# Patient Record
Sex: FEMALE | Race: BLACK OR AFRICAN AMERICAN | NOT HISPANIC OR LATINO | Employment: OTHER
[De-identification: names, ages, dates, MRNs, and addresses within clinical notes are randomized per-mention and may not be internally consistent; named-entity substitution may affect disease eponyms.]

---

## 2017-02-02 ENCOUNTER — HOSPITAL (OUTPATIENT)
Dept: OTHER | Age: 67
End: 2017-02-02
Attending: INTERNAL MEDICINE

## 2017-06-02 ENCOUNTER — HOSPITAL (OUTPATIENT)
Dept: OTHER | Age: 67
End: 2017-06-02
Attending: INTERNAL MEDICINE

## 2017-06-02 LAB
ALBUMIN: 4 G/DL (ref 3.5–5)
ANION GAP SERPL CALC-SCNC: 16 MEQ/L (ref 10–20)
BUN SERPL-MCNC: 17 MG/DL (ref 6–20)
CALCIUM: 9.9 MG/DL (ref 8.4–10.2)
CHLORIDE: 106 MEQ/L (ref 97–107)
CORR CA*PHOS: 44
CORR CALCIUM: 9.9 MG/DL (ref 8.4–10.2)
CREATININE: 1.18 MG/DL (ref 0.6–1.3)
GLUCOSE LVL: 112 MG/DL (ref 70–99)
HEMOLYSIS 1+: NEGATIVE
HEMOLYSIS 2+: NEGATIVE
ICTERIC 4+: NEGATIVE
LIPEMIC 2+: NEGATIVE
LIPEMIC 3+: NEGATIVE
MAGNESIUM LEVEL: 1.9 MG/DL (ref 1.6–2.6)
PHOSPHORUS: 4.4 MG/DL (ref 2.3–4.7)
POTASSIUM: 4.8 MEQ/L (ref 3.5–5.1)
SODIUM: 143 MEQ/L (ref 136–145)
TCO2: 26 MEQ/L (ref 19–29)
URIC ACID: 8.3 MG/DL (ref 2.6–6)
VIT D 25 OH LVL: 45.5 NG/ML

## 2017-11-02 ENCOUNTER — HOSPITAL (OUTPATIENT)
Dept: OTHER | Age: 67
End: 2017-11-02
Attending: INTERNAL MEDICINE

## 2017-11-02 LAB
ALBUMIN: 3.8 G/DL (ref 3.5–5)
ANION GAP SERPL CALC-SCNC: 14 MEQ/L (ref 10–20)
BUN SERPL-MCNC: 30 MG/DL (ref 6–20)
CALCIUM: 10.2 MG/DL (ref 8.4–10.2)
CHLORIDE: 107 MEQ/L (ref 97–107)
CORR CA*PHOS: 38
CORR CALCIUM: 10.4 MG/DL (ref 8.4–10.2)
CREATININE: 1.4 MG/DL (ref 0.6–1.3)
GLUCOSE LVL: 102 MG/DL (ref 70–99)
HEMOLYSIS 2+: NEGATIVE
LIPEMIC 3+: NEGATIVE
PHOSPHORUS: 3.7 MG/DL (ref 2.3–4.7)
POTASSIUM: 4.2 MEQ/L (ref 3.5–5.1)
PROT/CREAT RATIO: 0.2
SODIUM: 143 MEQ/L (ref 136–145)
TCO2: 26 MEQ/L (ref 19–29)
U CREATININE: 258.12 MG/DL
U PROTEIN: 40 MG/DL
UA APPEAR: ABNORMAL
UA BACTERIA: ABNORMAL
UA BILI: NEGATIVE
UA BLOOD: ABNORMAL
UA COLOR: YELLOW
UA EPITHELIAL: ABNORMAL
UA GLUCOSE: NEGATIVE
UA KETONES: ABNORMAL
UA LEUK EST: ABNORMAL
UA NITRITE: NEGATIVE
UA PH: 6 (ref 5–7)
UA PROTEIN: ABNORMAL
UA RBC: ABNORMAL
UA SPEC GRAV: 1.01 (ref 1.01–1.02)
UA UROBILINOGEN: 1 MG/DL (ref 0.2–1)
UA WBC: ABNORMAL

## 2018-02-08 ENCOUNTER — HOSPITAL (OUTPATIENT)
Dept: OTHER | Age: 68
End: 2018-02-08
Attending: INTERNAL MEDICINE

## 2018-02-08 LAB
ANION GAP SERPL CALC-SCNC: 15 MEQ/L (ref 10–20)
BUN SERPL-MCNC: 21 MG/DL (ref 6–20)
CALCIUM: 9.8 MG/DL (ref 8.4–10.2)
CHLORIDE: 108 MEQ/L (ref 97–107)
CREATININE: 1.22 MG/DL (ref 0.6–1.3)
GLUCOSE LVL: 112 MG/DL (ref 70–99)
HEMOLYSIS 2+: NEGATIVE
POTASSIUM: 4.3 MEQ/L (ref 3.5–5.1)
PROT/CREAT RATIO: 0.2
SODIUM: 144 MEQ/L (ref 136–145)
TCO2: 25 MEQ/L (ref 19–29)
U CREATININE: 132.36 MG/DL
U PROTEIN: 21 MG/DL
UA APPEAR: CLEAR
UA BACTERIA: ABNORMAL
UA BILI: NEGATIVE
UA BLOOD: ABNORMAL
UA CAST: ABNORMAL
UA COLOR: YELLOW
UA EPITHELIAL: ABNORMAL
UA GLUCOSE: NEGATIVE
UA KETONES: NEGATIVE
UA LEUK EST: ABNORMAL
UA MUCOUS: ABNORMAL
UA NITRITE: NEGATIVE
UA PH: 5.5 (ref 5–7)
UA PROTEIN: NEGATIVE
UA RBC: ABNORMAL
UA SPEC GRAV: <=1.005 (ref 1.01–1.02)
UA UROBILINOGEN: 1 MG/DL (ref 0.2–1)
UA WBC: ABNORMAL

## 2018-02-22 ENCOUNTER — HOSPITAL (OUTPATIENT)
Dept: OTHER | Age: 68
End: 2018-02-22
Attending: INTERNAL MEDICINE

## 2018-06-07 ENCOUNTER — HOSPITAL (OUTPATIENT)
Dept: OTHER | Age: 68
End: 2018-06-07
Attending: INTERNAL MEDICINE

## 2018-06-07 LAB
ANION GAP SERPL CALC-SCNC: 14 MEQ/L (ref 10–20)
BUN SERPL-MCNC: 21 MG/DL (ref 6–20)
CALCIUM: 9.7 MG/DL (ref 8.4–10.2)
CHLORIDE: 107 MEQ/L (ref 97–107)
CREATININE: 1.43 MG/DL (ref 0.6–1.3)
GLUCOSE LVL: 112 MG/DL (ref 70–99)
HEMOLYSIS 2+: NEGATIVE
POTASSIUM: 4.9 MEQ/L (ref 3.5–5.1)
SODIUM: 143 MEQ/L (ref 136–145)
TCO2: 27 MEQ/L (ref 19–29)
UA APPEAR: CLEAR
UA BACTERIA: ABNORMAL
UA BILI: NEGATIVE
UA BLOOD: ABNORMAL
UA CAST: ABNORMAL
UA COLOR: YELLOW
UA EPITHELIAL: ABNORMAL
UA GLUCOSE: NEGATIVE
UA KETONES: ABNORMAL
UA LEUK EST: ABNORMAL
UA NITRITE: NEGATIVE
UA PH: 6 (ref 5–7)
UA PROTEIN: NEGATIVE
UA RBC: ABNORMAL
UA SPEC GRAV: 1.01 (ref 1.01–1.02)
UA UROBILINOGEN: 1 MG/DL (ref 0.2–1)
UA WBC: ABNORMAL

## 2018-06-26 PROBLEM — M16.0 PRIMARY OSTEOARTHRITIS OF BOTH HIPS: Status: ACTIVE | Noted: 2017-06-02

## 2018-10-12 ENCOUNTER — HOSPITAL (OUTPATIENT)
Dept: OTHER | Age: 68
End: 2018-10-12
Attending: INTERNAL MEDICINE

## 2018-10-12 LAB
ABS LYMPH: 1.8 K/CUMM (ref 1–3.5)
ABS MONO: 0.6 K/CUMM (ref 0.1–0.8)
ABS NEUTRO: 2.8 K/CUMM (ref 2–8)
ANION GAP SERPL CALC-SCNC: 13 MEQ/L (ref 10–20)
BASOPHIL: 1 % (ref 0–1)
BUN SERPL-MCNC: 23 MG/DL (ref 6–20)
CALCIUM: 9.4 MG/DL (ref 8.4–10.2)
CHLORIDE: 110 MEQ/L (ref 97–107)
CREATININE: 1.5 MG/DL (ref 0.6–1.3)
DIFF_TYPE?: ABNORMAL
EOSINOPHIL: 2 % (ref 0–6)
GLUCOSE LVL: 115 MG/DL (ref 70–99)
HCT VFR BLD CALC: 34 % (ref 33–45)
HEMOLYSIS 1+: NEGATIVE
HEMOLYSIS 2+: NEGATIVE
HGB BLD-MCNC: 11 G/DL (ref 11–15)
IMMATURE GRAN: 0.2 % (ref 0–0.3)
INSTR WBC: 5.3 K/CUMM (ref 4–11)
LYMPHOCYTE: 33 %
MCH RBC QN AUTO: 31 PG (ref 25–35)
MCHC RBC AUTO-ENTMCNC: 33 G/DL (ref 32–37)
MCV RBC AUTO: 96 FL (ref 78–97)
MONOCYTE: 10 %
NEUTROPHIL: 53 %
NRBC BLD MANUAL-RTO: 0 % (ref 0–0.2)
PLATELET: 244 K/CUMM (ref 150–450)
POTASSIUM: 4.1 MEQ/L (ref 3.5–5.1)
PROT/CREAT RATIO: 0.1
RBC # BLD: 3.51 M/CUMM (ref 3.7–5.2)
RDW: 13.8 % (ref 11.5–14.5)
SODIUM: 144 MEQ/L (ref 136–145)
TCO2: 25 MEQ/L (ref 19–29)
U CREATININE: 150.69 MG/DL
U PROTEIN: 21 MG/DL
UA APPEAR: CLEAR
UA BACTERIA: ABNORMAL
UA BILI: NEGATIVE
UA BLOOD: ABNORMAL
UA CAST: ABNORMAL
UA COLOR: YELLOW
UA EPITHELIAL: ABNORMAL
UA GLUCOSE: NEGATIVE
UA KETONES: ABNORMAL
UA LEUK EST: ABNORMAL
UA NITRITE: NEGATIVE
UA PH: 6 (ref 5–7)
UA PROTEIN: NEGATIVE
UA RBC: ABNORMAL
UA SPEC GRAV: 1.02 (ref 1.01–1.02)
UA UROBILINOGEN: 0.2 MG/DL (ref 0.2–1)
UA WBC: ABNORMAL
VIT D 25 OH LVL: 28 NG/ML
WBC # BLD: 5.3 K/CUMM (ref 4–11)

## 2018-11-05 ENCOUNTER — HOSPITAL (OUTPATIENT)
Dept: OTHER | Age: 68
End: 2018-11-05
Attending: INTERNAL MEDICINE

## 2018-11-05 LAB
ABS LYMPH: 1.7 K/CUMM (ref 1–3.5)
ABS MONO: 0.6 K/CUMM (ref 0.1–0.8)
ABS NEUTRO: 4.8 K/CUMM (ref 2–8)
ALBUMIN: 3.8 G/DL (ref 3.5–5)
ANION GAP SERPL CALC-SCNC: 11 MEQ/L (ref 10–20)
BASOPHIL: 1 % (ref 0–1)
BUN SERPL-MCNC: 28 MG/DL (ref 6–20)
CALCIUM: 9.5 MG/DL (ref 8.4–10.2)
CHLORIDE: 111 MEQ/L (ref 97–107)
CORR CA*PHOS: 37
CORR CALCIUM: 9.7 MG/DL (ref 8.4–10.2)
CREATININE: 1.56 MG/DL (ref 0.6–1.3)
DIFF_TYPE?: ABNORMAL
EOSINOPHIL: 1 % (ref 0–6)
GLUCOSE LVL: 104 MG/DL (ref 70–99)
HCT VFR BLD CALC: 33 % (ref 33–45)
HEMOLYSIS 2+: NEGATIVE
HGB BLD-MCNC: 10.9 G/DL (ref 11–15)
IMMATURE GRAN: 0.4 % (ref 0–0.3)
INSTR WBC: 7.2 K/CUMM (ref 4–11)
LIPEMIC 3+: NEGATIVE
LYMPHOCYTE: 24 %
MCH RBC QN AUTO: 31 PG (ref 25–35)
MCHC RBC AUTO-ENTMCNC: 33 G/DL (ref 32–37)
MCV RBC AUTO: 95 FL (ref 78–97)
MONOCYTE: 8 %
NEUTROPHIL: 66 %
NRBC BLD MANUAL-RTO: 0 % (ref 0–0.2)
PHOSPHORUS: 3.8 MG/DL (ref 2.3–4.7)
PLATELET: 255 K/CUMM (ref 150–450)
POTASSIUM: 4.2 MEQ/L (ref 3.5–5.1)
PROT/CREAT RATIO: NORMAL
RBC # BLD: 3.53 M/CUMM (ref 3.7–5.2)
RDW: 14.3 % (ref 11.5–14.5)
SODIUM: 141 MEQ/L (ref 136–145)
TCO2: 23 MEQ/L (ref 19–29)
U CREATININE: 47.84 MG/DL
U PROTEIN: <7 MG/DL
UA APPEAR: CLEAR
UA BACTERIA: ABNORMAL
UA BILI: NEGATIVE
UA BLOOD: NEGATIVE
UA COLOR: YELLOW
UA EPITHELIAL: ABNORMAL
UA GLUCOSE: NEGATIVE
UA KETONES: NEGATIVE
UA LEUK EST: ABNORMAL
UA NITRITE: NEGATIVE
UA PH: 6 (ref 5–7)
UA PROTEIN: NEGATIVE
UA RBC: ABNORMAL
UA SPEC GRAV: <=1.005 (ref 1.01–1.02)
UA UROBILINOGEN: 0.2 MG/DL (ref 0.2–1)
UA WBC: ABNORMAL
WBC # BLD: 7.2 K/CUMM (ref 4–11)

## 2018-12-07 ENCOUNTER — HOSPITAL (OUTPATIENT)
Dept: OTHER | Age: 68
End: 2018-12-07
Attending: INTERNAL MEDICINE

## 2018-12-07 LAB
ABS LYMPH: 1.7 K/CUMM (ref 1–3.5)
ABS MONO: 0.6 K/CUMM (ref 0.1–0.8)
ABS NEUTRO: 3.2 K/CUMM (ref 2–8)
ALBUMIN: 3.8 G/DL (ref 3.5–5)
ANION GAP SERPL CALC-SCNC: 15 MEQ/L (ref 10–20)
BASOPHIL: 0 % (ref 0–1)
BUN SERPL-MCNC: 20 MG/DL (ref 6–20)
CALCIUM: 9.5 MG/DL (ref 8.4–10.2)
CHLORIDE: 111 MEQ/L (ref 97–107)
CORR CA*PHOS: 34
CORR CALCIUM: 9.7 MG/DL (ref 8.4–10.2)
CREATININE: 1.43 MG/DL (ref 0.6–1.3)
DIFF_TYPE?: ABNORMAL
EOSINOPHIL: 1 % (ref 0–6)
GLUCOSE LVL: 111 MG/DL (ref 70–99)
HCT VFR BLD CALC: 34 % (ref 33–45)
HEMOLYSIS 2+: NEGATIVE
HGB BLD-MCNC: 11 G/DL (ref 11–15)
IMMATURE GRAN: 0.2 % (ref 0–0.3)
INSTR WBC: 5.6 K/CUMM (ref 4–11)
LIPEMIC 3+: NEGATIVE
LYMPHOCYTE: 30 %
MCH RBC QN AUTO: 30 PG (ref 25–35)
MCHC RBC AUTO-ENTMCNC: 32 G/DL (ref 32–37)
MCV RBC AUTO: 94 FL (ref 78–97)
MONOCYTE: 10 %
NEUTROPHIL: 58 %
NRBC BLD MANUAL-RTO: 0 % (ref 0–0.2)
PHOSPHORUS: 3.5 MG/DL (ref 2.3–4.7)
PLATELET: 239 K/CUMM (ref 150–450)
POTASSIUM: 3.9 MEQ/L (ref 3.5–5.1)
RBC # BLD: 3.65 M/CUMM (ref 3.7–5.2)
RDW: 13.9 % (ref 11.5–14.5)
SODIUM: 144 MEQ/L (ref 136–145)
TCO2: 22 MEQ/L (ref 19–29)
UA APPEAR: CLEAR
UA BACTERIA: ABNORMAL
UA BILI: NEGATIVE
UA BLOOD: NEGATIVE
UA CAST: ABNORMAL
UA COLOR: YELLOW
UA EPITHELIAL: ABNORMAL
UA GLUCOSE: NEGATIVE
UA KETONES: ABNORMAL
UA LEUK EST: NEGATIVE
UA NITRITE: NEGATIVE
UA PH: 5 (ref 5–7)
UA PROTEIN: ABNORMAL
UA RBC: ABNORMAL
UA SPEC GRAV: 1.02 (ref 1.01–1.02)
UA UROBILINOGEN: 1 MG/DL (ref 0.2–1)
UA WBC: ABNORMAL
WBC # BLD: 5.6 K/CUMM (ref 4–11)

## 2018-12-18 PROBLEM — D64.9 ANEMIA: Status: ACTIVE | Noted: 2017-06-15

## 2018-12-18 PROBLEM — M25.559 PAIN IN JOINT, PELVIC REGION AND THIGH: Status: ACTIVE | Noted: 2017-03-31

## 2018-12-18 PROBLEM — E87.5 HYPERKALEMIA: Status: ACTIVE | Noted: 2017-06-15

## 2019-03-19 ENCOUNTER — HOSPITAL (OUTPATIENT)
Dept: OTHER | Age: 69
End: 2019-03-19
Attending: INTERNAL MEDICINE

## 2019-03-19 PROCEDURE — 81001 URINALYSIS AUTO W/SCOPE: CPT | Performed by: INTERNAL MEDICINE

## 2019-03-19 PROCEDURE — 87086 URINE CULTURE/COLONY COUNT: CPT | Performed by: INTERNAL MEDICINE

## 2019-12-10 PROBLEM — Z86.39 HISTORY OF HYPERPARATHYROIDISM: Status: ACTIVE | Noted: 2019-12-10

## 2020-02-21 ENCOUNTER — HOSPITAL (OUTPATIENT)
Dept: OTHER | Age: 70
End: 2020-02-21
Attending: INTERNAL MEDICINE

## 2020-10-21 PROBLEM — N18.30 STAGE 3 CHRONIC KIDNEY DISEASE, UNSPECIFIED WHETHER STAGE 3A OR 3B CKD (HCC): Status: ACTIVE | Noted: 2020-10-21

## 2021-01-29 PROBLEM — G47.33 OBSTRUCTIVE SLEEP APNEA SYNDROME: Status: ACTIVE | Noted: 2021-01-29

## 2021-01-29 PROBLEM — R06.09 DYSPNEA ON EXERTION: Status: ACTIVE | Noted: 2021-01-29

## 2021-01-29 PROBLEM — I27.20 PULMONARY HTN (HCC): Status: ACTIVE | Noted: 2021-01-29

## 2021-01-29 PROBLEM — R06.00 DYSPNEA ON EXERTION: Status: ACTIVE | Noted: 2021-01-29

## 2021-08-18 ENCOUNTER — HOSPITAL ENCOUNTER (OUTPATIENT)
Dept: MAMMOGRAPHY | Age: 71
Discharge: HOME | End: 2021-08-18
Attending: INTERNAL MEDICINE

## 2021-08-18 ENCOUNTER — HOSPITAL ENCOUNTER (OUTPATIENT)
Dept: NUCLEAR MEDICINE | Age: 71
Discharge: HOME | End: 2021-08-18
Attending: INTERNAL MEDICINE

## 2021-08-18 DIAGNOSIS — Z78.0 POSTMENOPAUSAL: ICD-10-CM

## 2021-08-18 DIAGNOSIS — Z12.31 ENCOUNTER FOR SCREENING MAMMOGRAM FOR MALIGNANT NEOPLASM OF BREAST: ICD-10-CM

## 2021-08-18 PROCEDURE — 77063 BREAST TOMOSYNTHESIS BI: CPT

## 2021-08-18 PROCEDURE — 77080 DXA BONE DENSITY AXIAL: CPT

## 2021-11-10 PROBLEM — Z01.818 PREOPERATIVE EVALUATION TO RULE OUT SURGICAL CONTRAINDICATION: Status: ACTIVE | Noted: 2021-11-10

## 2022-01-25 PROBLEM — Z96.642 STATUS POST LEFT HIP REPLACEMENT: Status: ACTIVE | Noted: 2022-01-25

## 2022-06-01 ENCOUNTER — APPOINTMENT (OUTPATIENT)
Dept: LAB | Age: 72
End: 2022-06-01
Attending: INTERNAL MEDICINE

## 2022-06-09 ENCOUNTER — APPOINTMENT (OUTPATIENT)
Dept: LAB | Age: 72
End: 2022-06-09

## 2022-06-14 ENCOUNTER — HOSPITAL ENCOUNTER (OUTPATIENT)
Dept: LAB | Age: 72
Discharge: HOME | End: 2022-06-14
Attending: INTERNAL MEDICINE

## 2022-06-14 LAB
ALBUMIN SERPL-MCNC: 3.7 G/DL (ref 3.6–5.1)
ALBUMIN/GLOB SERPL: 1 {RATIO} (ref 1–2.4)
ALP SERPL-CCNC: 109 UNITS/L (ref 45–117)
ALT SERPL-CCNC: 17 UNITS/L
ANION GAP SERPL CALC-SCNC: 14 MMOL/L (ref 7–19)
APPEARANCE UR: ABNORMAL
AST SERPL-CCNC: 18 UNITS/L
BACTERIA #/AREA URNS HPF: ABNORMAL /HPF
BASOPHILS # BLD: 0.1 K/MCL (ref 0–0.3)
BASOPHILS NFR BLD: 1 %
BILIRUB SERPL-MCNC: 1 MG/DL (ref 0.2–1)
BILIRUB UR QL STRIP: NEGATIVE
BUN SERPL-MCNC: 24 MG/DL (ref 6–20)
BUN/CREAT SERPL: 17 (ref 7–25)
CALCIUM SERPL-MCNC: 8.9 MG/DL (ref 8.4–10.2)
CHLORIDE SERPL-SCNC: 107 MMOL/L (ref 97–110)
CHOLEST SERPL-MCNC: 104 MG/DL
CHOLEST/HDLC SERPL: 2.2 {RATIO}
CO2 SERPL-SCNC: 27 MMOL/L (ref 21–32)
COLOR UR: YELLOW
CREAT SERPL-MCNC: 1.44 MG/DL (ref 0.51–0.95)
CREAT UR-MCNC: 101 MG/DL
DEPRECATED RDW RBC: 48.4 FL (ref 39–50)
EOSINOPHIL # BLD: 0.1 K/MCL (ref 0–0.5)
EOSINOPHIL NFR BLD: 1 %
ERYTHROCYTE [DISTWIDTH] IN BLOOD: 14.2 % (ref 11–15)
FASTING DURATION TIME PATIENT: ABNORMAL H
FASTING DURATION TIME PATIENT: ABNORMAL H
GFR SERPLBLD BASED ON 1.73 SQ M-ARVRAT: 38 ML/MIN
GLOBULIN SER-MCNC: 3.6 G/DL (ref 2–4)
GLUCOSE SERPL-MCNC: 136 MG/DL (ref 70–99)
GLUCOSE UR STRIP-MCNC: NEGATIVE MG/DL
HCT VFR BLD CALC: 36.4 % (ref 36–46.5)
HDLC SERPL-MCNC: 47 MG/DL
HGB BLD-MCNC: 11.9 G/DL (ref 12–15.5)
HGB UR QL STRIP: NEGATIVE
HYALINE CASTS #/AREA URNS LPF: ABNORMAL /LPF
IMM GRANULOCYTES # BLD AUTO: 0 K/MCL (ref 0–0.2)
IMM GRANULOCYTES # BLD: 0 %
KETONES UR STRIP-MCNC: NEGATIVE MG/DL
LDLC SERPL CALC-MCNC: 48 MG/DL
LEUKOCYTE ESTERASE UR QL STRIP: ABNORMAL
LYMPHOCYTES # BLD: 2.3 K/MCL (ref 1–4)
LYMPHOCYTES NFR BLD: 32 %
MCH RBC QN AUTO: 30 PG (ref 26–34)
MCHC RBC AUTO-ENTMCNC: 32.7 G/DL (ref 32–36.5)
MCV RBC AUTO: 91.7 FL (ref 78–100)
MICROALBUMIN UR-MCNC: 1.33 MG/DL
MICROALBUMIN/CREAT UR: 13.2 MG/G
MONOCYTES # BLD: 0.5 K/MCL (ref 0.3–0.9)
MONOCYTES NFR BLD: 7 %
NEUTROPHILS # BLD: 4.3 K/MCL (ref 1.8–7.7)
NEUTROPHILS NFR BLD: 59 %
NITRITE UR QL STRIP: NEGATIVE
NONHDLC SERPL-MCNC: 57 MG/DL
NRBC BLD MANUAL-RTO: 0 /100 WBC
PH UR STRIP: 7 [PH] (ref 5–7)
PLATELET # BLD AUTO: 305 K/MCL (ref 140–450)
POTASSIUM SERPL-SCNC: 4 MMOL/L (ref 3.4–5.1)
PROT SERPL-MCNC: 7.3 G/DL (ref 6.4–8.2)
PROT UR STRIP-MCNC: NEGATIVE MG/DL
RBC # BLD: 3.97 MIL/MCL (ref 4–5.2)
RBC #/AREA URNS HPF: ABNORMAL /HPF
SODIUM SERPL-SCNC: 144 MMOL/L (ref 135–145)
SP GR UR STRIP: 1.01 (ref 1–1.03)
SQUAMOUS #/AREA URNS HPF: ABNORMAL /HPF
TRIGL SERPL-MCNC: 43 MG/DL
UROBILINOGEN UR STRIP-MCNC: 0.2 MG/DL
WBC # BLD: 7.3 K/MCL (ref 4.2–11)
WBC #/AREA URNS HPF: ABNORMAL /HPF

## 2022-06-14 PROCEDURE — 82043 UR ALBUMIN QUANTITATIVE: CPT | Performed by: INTERNAL MEDICINE

## 2022-06-14 PROCEDURE — 80061 LIPID PANEL: CPT | Performed by: INTERNAL MEDICINE

## 2022-06-14 PROCEDURE — 80053 COMPREHEN METABOLIC PANEL: CPT | Performed by: INTERNAL MEDICINE

## 2022-06-14 PROCEDURE — 85025 COMPLETE CBC W/AUTO DIFF WBC: CPT | Performed by: INTERNAL MEDICINE

## 2022-06-14 PROCEDURE — 81001 URINALYSIS AUTO W/SCOPE: CPT | Performed by: INTERNAL MEDICINE

## 2022-06-14 PROCEDURE — 36415 COLL VENOUS BLD VENIPUNCTURE: CPT | Performed by: INTERNAL MEDICINE

## 2022-09-14 ENCOUNTER — HOSPITAL ENCOUNTER (OUTPATIENT)
Dept: MAMMOGRAPHY | Age: 72
Discharge: HOME | End: 2022-09-14
Attending: INTERNAL MEDICINE

## 2022-09-14 DIAGNOSIS — Z12.31 VISIT FOR SCREENING MAMMOGRAM: ICD-10-CM

## 2022-09-14 PROCEDURE — 77063 BREAST TOMOSYNTHESIS BI: CPT

## 2023-01-18 ENCOUNTER — LAB ENCOUNTER (OUTPATIENT)
Dept: LAB | Facility: HOSPITAL | Age: 73
End: 2023-01-18
Attending: INTERNAL MEDICINE
Payer: MEDICARE

## 2023-01-18 DIAGNOSIS — N18.30 CHRONIC KIDNEY DISEASE, STAGE III (MODERATE) (HCC): ICD-10-CM

## 2023-01-18 DIAGNOSIS — Z01.818 PREOPERATIVE EXAMINATION, UNSPECIFIED: ICD-10-CM

## 2023-01-18 DIAGNOSIS — N18.6 TYPE 2 DIABETES MELLITUS WITH ESRD (END-STAGE RENAL DISEASE) (HCC): Primary | ICD-10-CM

## 2023-01-18 DIAGNOSIS — E11.22 TYPE 2 DIABETES MELLITUS WITH ESRD (END-STAGE RENAL DISEASE) (HCC): Primary | ICD-10-CM

## 2023-01-18 DIAGNOSIS — E78.00 PURE HYPERCHOLESTEROLEMIA: ICD-10-CM

## 2023-01-18 DIAGNOSIS — Z51.81 ENCOUNTER FOR THERAPEUTIC DRUG MONITORING: ICD-10-CM

## 2023-01-18 LAB
ALBUMIN SERPL-MCNC: 3.5 G/DL (ref 3.4–5)
ALBUMIN/GLOB SERPL: 1 {RATIO} (ref 1–2)
ALP LIVER SERPL-CCNC: 110 U/L
ALT SERPL-CCNC: 14 U/L
ANION GAP SERPL CALC-SCNC: 3 MMOL/L (ref 0–18)
AST SERPL-CCNC: 16 U/L (ref 15–37)
BASOPHILS # BLD AUTO: 0.02 X10(3) UL (ref 0–0.2)
BASOPHILS NFR BLD AUTO: 0.3 %
BILIRUB SERPL-MCNC: 1.1 MG/DL (ref 0.1–2)
BILIRUB UR QL: NEGATIVE
BUN BLD-MCNC: 18 MG/DL (ref 7–18)
BUN/CREAT SERPL: 12.8 (ref 10–20)
CALCIUM BLD-MCNC: 8.9 MG/DL (ref 8.5–10.1)
CHLORIDE SERPL-SCNC: 108 MMOL/L (ref 98–112)
CHOLEST SERPL-MCNC: 114 MG/DL (ref ?–200)
CLARITY UR: CLEAR
CO2 SERPL-SCNC: 28 MMOL/L (ref 21–32)
COLOR UR: YELLOW
CREAT BLD-MCNC: 1.41 MG/DL
CREAT UR-SCNC: 59.6 MG/DL
DEPRECATED RDW RBC AUTO: 49.8 FL (ref 35.1–46.3)
EOSINOPHIL # BLD AUTO: 0.06 X10(3) UL (ref 0–0.7)
EOSINOPHIL NFR BLD AUTO: 0.8 %
ERYTHROCYTE [DISTWIDTH] IN BLOOD BY AUTOMATED COUNT: 14.5 % (ref 11–15)
FASTING PATIENT LIPID ANSWER: YES
FASTING STATUS PATIENT QL REPORTED: YES
GFR SERPLBLD BASED ON 1.73 SQ M-ARVRAT: 39 ML/MIN/1.73M2 (ref 60–?)
GLOBULIN PLAS-MCNC: 3.6 G/DL (ref 2.8–4.4)
GLUCOSE BLD-MCNC: 130 MG/DL (ref 70–99)
GLUCOSE UR-MCNC: NEGATIVE MG/DL
HCT VFR BLD AUTO: 35.3 %
HDLC SERPL-MCNC: 46 MG/DL (ref 40–59)
HGB BLD-MCNC: 11.1 G/DL
HGB UR QL STRIP.AUTO: NEGATIVE
IMM GRANULOCYTES # BLD AUTO: 0.04 X10(3) UL (ref 0–1)
IMM GRANULOCYTES NFR BLD: 0.5 %
KETONES UR-MCNC: NEGATIVE MG/DL
LDLC SERPL CALC-MCNC: 53 MG/DL (ref ?–100)
LYMPHOCYTES # BLD AUTO: 1.89 X10(3) UL (ref 1–4)
LYMPHOCYTES NFR BLD AUTO: 24 %
MCH RBC QN AUTO: 29.4 PG (ref 26–34)
MCHC RBC AUTO-ENTMCNC: 31.4 G/DL (ref 31–37)
MCV RBC AUTO: 93.4 FL
MICROALBUMIN UR-MCNC: 1.25 MG/DL
MICROALBUMIN/CREAT 24H UR-RTO: 21 UG/MG (ref ?–30)
MONOCYTES # BLD AUTO: 0.6 X10(3) UL (ref 0.1–1)
MONOCYTES NFR BLD AUTO: 7.6 %
NEUTROPHILS # BLD AUTO: 5.25 X10 (3) UL (ref 1.5–7.7)
NEUTROPHILS # BLD AUTO: 5.25 X10(3) UL (ref 1.5–7.7)
NEUTROPHILS NFR BLD AUTO: 66.8 %
NITRITE UR QL STRIP.AUTO: NEGATIVE
NONHDLC SERPL-MCNC: 68 MG/DL (ref ?–130)
OSMOLALITY SERPL CALC.SUM OF ELEC: 292 MOSM/KG (ref 275–295)
PH UR: 7 [PH] (ref 5–8)
PLATELET # BLD AUTO: 304 10(3)UL (ref 150–450)
POTASSIUM SERPL-SCNC: 3.7 MMOL/L (ref 3.5–5.1)
PROT SERPL-MCNC: 7.1 G/DL (ref 6.4–8.2)
PROT UR-MCNC: NEGATIVE MG/DL
RBC # BLD AUTO: 3.78 X10(6)UL
SODIUM SERPL-SCNC: 139 MMOL/L (ref 136–145)
SP GR UR STRIP: 1.01 (ref 1–1.03)
TRIGL SERPL-MCNC: 76 MG/DL (ref 30–149)
UROBILINOGEN UR STRIP-ACNC: 0.2
VLDLC SERPL CALC-MCNC: 11 MG/DL (ref 0–30)
WBC # BLD AUTO: 7.9 X10(3) UL (ref 4–11)

## 2023-01-18 PROCEDURE — 85025 COMPLETE CBC W/AUTO DIFF WBC: CPT

## 2023-01-18 PROCEDURE — 80061 LIPID PANEL: CPT

## 2023-01-18 PROCEDURE — 80053 COMPREHEN METABOLIC PANEL: CPT

## 2023-01-18 PROCEDURE — 81015 MICROSCOPIC EXAM OF URINE: CPT

## 2023-01-18 PROCEDURE — 81001 URINALYSIS AUTO W/SCOPE: CPT

## 2023-01-18 PROCEDURE — 87086 URINE CULTURE/COLONY COUNT: CPT

## 2023-01-18 PROCEDURE — 36415 COLL VENOUS BLD VENIPUNCTURE: CPT

## 2023-01-18 PROCEDURE — 82570 ASSAY OF URINE CREATININE: CPT

## 2023-01-18 PROCEDURE — 82043 UR ALBUMIN QUANTITATIVE: CPT

## 2023-09-19 ENCOUNTER — APPOINTMENT (OUTPATIENT)
Dept: MAMMOGRAPHY | Age: 73
End: 2023-09-19

## 2024-07-12 ENCOUNTER — TELEPHONE (OUTPATIENT)
Dept: HEMATOLOGY/ONCOLOGY | Facility: HOSPITAL | Age: 74
End: 2024-07-12

## 2024-08-09 ENCOUNTER — GENETICS ENCOUNTER (OUTPATIENT)
Dept: GENETICS | Facility: HOSPITAL | Age: 74
End: 2024-08-09
Attending: INTERNAL MEDICINE
Payer: MEDICARE

## 2024-08-09 ENCOUNTER — NURSE ONLY (OUTPATIENT)
Dept: HEMATOLOGY/ONCOLOGY | Facility: HOSPITAL | Age: 74
End: 2024-08-09
Attending: INTERNAL MEDICINE
Payer: MEDICARE

## 2024-08-09 DIAGNOSIS — D05.11 BREAST NEOPLASM, TIS (DCIS), RIGHT: Primary | ICD-10-CM

## 2024-08-09 PROCEDURE — 36415 COLL VENOUS BLD VENIPUNCTURE: CPT

## 2024-08-09 PROCEDURE — 96040 HC GENETIC COUNSELING EA 30 MIN: CPT | Performed by: GENETIC COUNSELOR, MS

## 2024-08-09 NOTE — PROGRESS NOTES
Reason for visit: Mrs. Hatchett is a 74-year-old woman referred for genetic counseling due to her recent diagnosis of multifocal breast cancer.  She was seen today to discuss the option of genetic testing and how this may help with her management and surgical options.  She is currently pending treatment. She is retired and is . She was seen with her friend, Adonay, for her consultation today  Referring MD: [place]    Medical History: Mrs. Hatchett is a 74-year-old woman who recently was diagnosed with multifocal breast cancer at age 73. She is pending treatment.   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  She understands that genetic testing may change her medical management.      Other medical history of note: Mrs. Hatchett reports she is overall in good health aside from this recent diagnosis.  She has had a colonoscopy with several polyps removed.  She denies any current dermatological concerns, thyroid issues or history of uterine fibroids.  She does share that her parathyroid was removed a number of years ago.  She was 10 years old at menarche and is nulliparous.  She is post-menopausal, having undergone natural menopause at age 48.  She denies any gynecologic surgery and has her uterus and her ovaries intact.  She denies any tobacco use and admits to consumption of wine occasionally.    Relevant family history: Mrs. Hatchett is not aware of any malignancies on either side of her family but does share that she has limited family information on this side due to family dynamics.  She is unaware of any family members who have undergone genetic testing to date. She is of -American ancestry on both sides of her family and denies any consanguinity or knowledge of [employer]kenazi [employer] heritage.      Summary:  Most breast cancer is not hereditary; however, hereditary cancer is suspected under certain conditions, such as when breast  cancer occurs prior to the age of 50 (or premenopausal), when there are multiple affected family members, when breast cancer occurs in combination with other cancers, especially ovarian cancer, and when cancer appears to be passing from generation to generation. Bilateral or multifocal breast cancer is another risk factor.  We discussed dominant inheritance, the pattern in which most hereditary cancer conditions are inherited.  If an individual has such a cancer predisposing gene mutation, there is a 50% chance that any offspring will not inherit the mutation and a 50% chance that he or she will inherit it.  Inheriting the mutation does not automatically mean that one will develop cancer, rather that there is an increased chance for developing certain types of cancer.  Cancer predisposing gene mutations can exist in males and females and can be passed on to both male and female offspring.    The pros and cons of cancer predisposing gene mutation testing were reviewed with Mrs. Hatchett and her friend.  Genetic test results can have significant impact on medical management, planning, screening, surgical decision-making, cancer risk assessment for the patient and relatives, and psychological/emotional well-being.  Mutations in the genes BRCA1 and BRCA2 account for most cases (but not all) of hereditary breast cancer.  Mutations in other genes have also been associated with an increased risk for breast cancer - but mutations in these other genes are statistically less often seen in hereditary breast cancer.    We discussed the benefits and limitations of BRCA1/2 testing versus multi-gene panels that include BRCA1/2 as well as other genes associated with different cancers, such as colon cancer.  Panels are an appropriate option for individuals whose history is suggestive of more than one syndrome, and they improve detection rate for identifying the underlying cause of a hereditary cancer.  Limitations of panels include  an unknown percentage of variants of unknown significance, as well as an uncertainty of level of risk associated with certain unknown-penetrance genes, and therefore lack of clear guidelines for risk management of carriers of some of these mutations.  There are panels that assess for mutations in only “high risk” and clinically actionable breast cancer genes as well as larger panels that assess for mutations in high, moderate and unknown-penetrance breast cancer genes.  Genetic testing could yield one of three results: no mutation detected, deleterious mutation detected, or variant of uncertain significance.  The implications of these potential results were reviewed with Mrs. Hatchett and her friend.  Given her diagnosis of multifocal breast cancer, and given that these results may change her medical management, and ovarian cancer, she meets both Medicare and NCCN criteria for HBOC testing.    Based on the above history and our discussion of genetic testing options, Mrs. Hatchett decided to proceed with a hereditary cancer panel through [facility], which encompasses 9 genes associated with high risk of breast cancers which is available on a STAT basis ([facility] Breast Cancer STAT panel with RUDY and CHEK2 added on).  Blood was drawn at her visit today and sent to [facility].  Turn-around-time is typically 7-14 days from receipt of the sample.  My office will call Mrs. Hatchett as soon as results are received; post-test counseling can be scheduled at that time.        Thank you for referring Mrs. Hatchett to Genetics; please do not hesitate to contact my office if you have any questions or concerns, [phone].  Plan:  Blood was drawn and sent out for [facility] Breast Cancer STAT Panel plus RUDY and CHEK2 testing through [facility].   The Genetics office will call Mrs. Hatchett when her results are available.  Recommendations for Mrs. Hatchett and family members will depend on above test results.    Send  to: Dr. Magana  Time spent with patient: 40 minutes

## 2024-08-15 ENCOUNTER — TELEPHONE (OUTPATIENT)
Dept: GENETICS | Facility: HOSPITAL | Age: 74
End: 2024-08-15

## 2024-08-15 NOTE — TELEPHONE ENCOUNTER
Shared NEGATIVE genetic testing results for 9 gene breast-focused STAT panel through [facility] ([facility] STAT Panel with RUDY and CHEK2). Released to her through lab's portal. She was pleased to hear these results. All her questions were answered to the best of my ability and she was appreciative of the call and the information.

## 2024-08-20 ENCOUNTER — TELEPHONE (OUTPATIENT)
Dept: GENETICS | Facility: HOSPITAL | Age: 74
End: 2024-08-20

## 2024-08-20 NOTE — TELEPHONE ENCOUNTER
Shared FINAL genetic testing results with Harika, which are for 70 genes+RNA through [facility] ([facility] Multi-Cancer Panel+RNA). 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 Released results to her through lab's portal and will mail her a copy. All her questions were answered to the best of my ability and she was appreciative of the call.

## 2025-06-03 ENCOUNTER — OFFICE VISIT (OUTPATIENT)
Facility: CLINIC | Age: 75
End: 2025-06-03
Payer: MEDICARE

## 2025-06-03 VITALS
OXYGEN SATURATION: 95 % | HEART RATE: 69 BPM | DIASTOLIC BLOOD PRESSURE: 84 MMHG | TEMPERATURE: 97 F | RESPIRATION RATE: 20 BRPM | HEIGHT: 67.5 IN | WEIGHT: 181 LBS | BODY MASS INDEX: 28.08 KG/M2 | SYSTOLIC BLOOD PRESSURE: 120 MMHG

## 2025-06-03 DIAGNOSIS — D05.11 DUCTAL CARCINOMA IN SITU (DCIS) OF RIGHT BREAST: Primary | ICD-10-CM

## 2025-06-03 PROBLEM — N18.31 STAGE 3A CHRONIC KIDNEY DISEASE (HCC): Status: ACTIVE | Noted: 2020-10-21

## 2025-06-03 PROCEDURE — 99203 OFFICE O/P NEW LOW 30 MIN: CPT | Performed by: SURGERY

## 2025-06-03 RX ORDER — MULTIVIT-MIN/IRON FUM/FOLIC AC 7.5 MG-4
1 TABLET ORAL DAILY
COMMUNITY

## 2025-06-03 NOTE — CONSULTS
New Patient Consultation    This is the first visit for this 74 year old female who presents to discuss reconstructive options following mastectomy.    History of Present Illness:   The patient is a 74 year old female who presents with right DCIS found by radiographic abnormality.  The patient ultimately underwent biopsy which revealed DCIS.  She does not have breast pain. She does not have family history of breast cancer. She does have significant past history for breast diseases or breast surgery.  This includes right breast conservation therapy in 2024.  The patient was seen by Dr. Magana and has elected to undergo a right skin-sparing mastectomy.  She is here today to discuss post-mastectomy reconstructive options.  She is unsure as to whether she wishes to proceed with immediate reconstruction.    Past Medical History:      Past Medical History[1]      Past Surgical History:  Past Surgical History[2]      Medications:    Current Medications[3]      Allergies:    Allergies[4]      Family History:   Family History[5]      Social History:  History   Alcohol Use    Yes     Comment: wine 2-3x week       History   Smoking Status    Never   Smokeless Tobacco    Never       History   Drug Use No           Review of Systems:    General:   The patient denies, fever, chills, night sweats, fatigue, generalized weakness, change in appetite, weight loss, or weight gain.    Endocrine:  There is no history of poor/slow wound healing, weight loss/gain, fertility or hormone problems, cold intolerance, heat intolerance, excessive thirst, or thyroid disease.    Allergic/Immunologic:  There is no history of hives, hay fever, angioedema or anaphylaxis.    HEENT:  The patient denies ear pain, ear drainage, hearing loss, change in vision, double vision, cataracts, glaucoma, nasal congestion, nosebleed, hoarseness, sore throat, or swollen glands.    Respiratory:  The patient denies shortness of breath, cough, bloody cough, phlegm,  asthma, or wheezing.    Cardiovascular:   The patient denies chest pain/pressure, palpitations, irregular heartbeat, high blood pressure, stroke, or leg swelling.    Breasts:  The patient denies breast masses, pain, change in the breast skin, skin dimpling, nipple discharge, or rash.    Gastrointestinal:  There is no history of difficulty or pain with swallowing, reflux symptoms, nausea, vomiting, dark/bloody stools, diarrhea, constipation, change in bowel habits, or abdominal pain.    Genitourinary:  The patient denies frequent urination, needing to get up at night to urinate, urinary hesitancy or retaining urine, painful urination, urinary incontinence, decreased urine stream, blood in urine, or vaginal/penile discharge.    Skin:  Then patient denies rash, itching, skin lesions, dry skin, change in skin color or change in moles, sunburn with blistering.    Hematologic/Lymphatic:  The patient denies easily bruising or bleeding, persistent swollen glands or lymph nodes, bleeding disorders, blood clots, or pulmonary embolism.    Gynecologic:  The patient denies irregular menses, pelvic pain, pain with intercourse, painful menses, or pregnancy.    Musculoskeletal:  The patient denies muscle aches/pain, joint pain, stiff joints, neck pain, back pain or bone pain.    Neurologic:  There is no history of migraines or severe headaches, seizure/epilepsy, speech problems, coordination problems, trembling/tremors, fainting/black outs, dizziness, memory problems, loss of sensation/numbness, problems walking, weakness, tingling or burning in hands/feet.     Psychiatric:  There is no history of abusive relationship, bipolar disorder, sleep disturbance, anxiety, depression or feeling of despair.       Physical Exam:  Vitals:    06/03/25 1117   BP: 120/84   Pulse: 69   Resp: 20   Temp: 97.3 °F (36.3 °C)     Body mass index is 27.93 kg/m².      The patient is awake, alert, and oriented.  She is well-nourished, well-developed woman  who appears her stated age. Her speech patterns and movements are normal. Her affect is appropriate.    HEENT: The head is normocephalic. The neck is supple. The thyroid is not enlarged and is without palpable masses.  The trachea is in the midline. Conjunctiva are clear, non-icteric.    Right breast: Grade 2 ptosis is noted.  Striae are noted.  Measurements: sternal notch to nipple 27cm, nipple to inframammary fold 13cm, base diameter 16cm.  The skin, nipple, and areola appear normal.  There is no nipple retraction or discharge.  There are no dominant masses in the breast.     Left breast:  Grade 2 ptosis is noted.  Striae are noted.  Measurements:  sternal notch to nipple 28cm, nipple to inframammary fold 13cm, base diameter 16cm.  The skin, nipple, and areola appear normal.  There is no nipple retraction or discharge.  There are no dominant masses in the breast.      Abdomen: Moderate generalized abdominal wall laxity is noted.  A small to moderate lower abdominal pannus with striations is noted.    Lymph Nodes:  There is no cervical, supraclavicular, or axillary lymphadenopathy appreciated.    Back: There is no vertebral column tenderness.    Skin: The skin appears normal. There are no suspicious appearing rashes or lesions.    Extremities: The extremities are without deformity, cyanosis or edema.    Impression:   The patient is a 74 year old female who presents with right DCIS awaiting right mastectomy.     Discussion and Plan:  We reviewed the options for post-mastectomy reconstruction and discussed the risks/benefits of timing (immediate versus delayed) and technique (implant-based versus autologous).  Given the patient's age, history of radiation therapy, and comorbidities including diabetes mellitus, the patient was counseled on the elevated risk of wound healing difficulties and reconstructive loss with immediate reconstruction.  As such, the patient has elected to undergo mastectomy without  reconstruction.  The patient was offered to return anytime should she wish to discuss delayed reconstruction or have any new questions or concerns. The patient was appreciative of the evaluation and will return on a as needed basis.        [1]   Past Medical History:   Acute renal failure (ARF)    BRCA gene mutation negative in female    negative for 70 gene panel+RNA through [facility] aside from VUS in M[facility]2. Results in media tab    CKD (chronic kidney disease) stage 3, GFR 30-59 ml/min (HCC)    Diabetes (HCC)    DJD (degenerative joint disease)    Edema    Essential hypertension    H/O parathyroidectomy    High blood pressure    High cholesterol    High potassium    History of anemia    LVH (left ventricular hypertrophy) due to hypertensive disease    Mitral regurgitation    Primary osteoarthritis of both hips    Pure hypercholesterolemia    Renal disorder    Tricuspid regurgitation    Type 2 diabetes mellitus with diabetic chronic kidney disease (HCC)    Vitamin D deficiency, unspecified   [2]   Past Surgical History:  Procedure Laterality Date    Colonoscopy  04/2010    Parathyroidectomy  08/2006    Root canal/non-surgical access      Total hip replacement Bilateral     2022/2023   [3]   No outpatient medications have been marked as taking for the 6/3/25 encounter (Appointment) with John Williamson MD.   [4]   Allergies  Allergen Reactions    Other SWELLING     Contrast dye for CT    Calcium UNKNOWN    Diltiazem OTHER (SEE COMMENTS)     Gum swelling    Lisinopril UNKNOWN    Hydrochlorothiazide W/Triamterene RASH   [5]   Family History  Problem Relation Age of Onset    Heart Disease Father     Hypertension Mother     Stroke Mother 46    Hypertension Half-Sister     Stroke Half-Sister     Other (history unknown) Half-Sister     No Known Problems Half-Brother     No Known Problems Half-Brother     No Known Problems Half-Brother     Alcohol and Other Disorders Associated Half-Brother     Other (HIV) Half-Brother     No  Known Problems Half-Brother

## 2025-06-12 ENCOUNTER — ANESTHESIA EVENT (OUTPATIENT)
Dept: SURGERY | Facility: HOSPITAL | Age: 75
End: 2025-06-12
Payer: MEDICARE

## 2025-06-12 ENCOUNTER — HOSPITAL ENCOUNTER (OUTPATIENT)
Facility: HOSPITAL | Age: 75
Setting detail: HOSPITAL OUTPATIENT SURGERY
Discharge: HOME | End: 2025-06-12
Attending: SURGERY | Admitting: SURGERY
Payer: MEDICARE

## 2025-06-12 ENCOUNTER — HOSPITAL ENCOUNTER (OUTPATIENT)
Dept: NUCLEAR MEDICINE | Facility: HOSPITAL | Age: 75
Discharge: HOME | End: 2025-06-12
Attending: SURGERY
Payer: MEDICARE

## 2025-06-12 ENCOUNTER — ANESTHESIA (OUTPATIENT)
Dept: SURGERY | Facility: HOSPITAL | Age: 75
End: 2025-06-12
Payer: MEDICARE

## 2025-06-12 VITALS
TEMPERATURE: 98 F | BODY MASS INDEX: 26.84 KG/M2 | DIASTOLIC BLOOD PRESSURE: 86 MMHG | HEART RATE: 60 BPM | RESPIRATION RATE: 16 BRPM | SYSTOLIC BLOOD PRESSURE: 137 MMHG | WEIGHT: 173 LBS | HEIGHT: 67.5 IN | OXYGEN SATURATION: 96 %

## 2025-06-12 DIAGNOSIS — D05.11 BREAST NEOPLASM, TIS (DCIS), RIGHT: Primary | ICD-10-CM

## 2025-06-12 DIAGNOSIS — C50.911 BREAST CANCER, RIGHT (HCC): ICD-10-CM

## 2025-06-12 LAB
GLUCOSE BLDC GLUCOMTR-MCNC: 129 MG/DL (ref 70–99)
GLUCOSE BLDC GLUCOMTR-MCNC: 145 MG/DL (ref 70–99)

## 2025-06-12 PROCEDURE — 78195 LYMPH SYSTEM IMAGING: CPT | Performed by: SURGERY

## 2025-06-12 PROCEDURE — 88305 TISSUE EXAM BY PATHOLOGIST: CPT | Performed by: SURGERY

## 2025-06-12 PROCEDURE — 88360 TUMOR IMMUNOHISTOCHEM/MANUAL: CPT | Performed by: SURGERY

## 2025-06-12 PROCEDURE — 88309 TISSUE EXAM BY PATHOLOGIST: CPT | Performed by: SURGERY

## 2025-06-12 PROCEDURE — 88334 PATH CONSLTJ SURG CYTO XM EA: CPT | Performed by: SURGERY

## 2025-06-12 PROCEDURE — 76942 ECHO GUIDE FOR BIOPSY: CPT | Performed by: STUDENT IN AN ORGANIZED HEALTH CARE EDUCATION/TRAINING PROGRAM

## 2025-06-12 PROCEDURE — 88342 IMHCHEM/IMCYTCHM 1ST ANTB: CPT | Performed by: SURGERY

## 2025-06-12 PROCEDURE — 88307 TISSUE EXAM BY PATHOLOGIST: CPT | Performed by: SURGERY

## 2025-06-12 PROCEDURE — 88333 PATH CONSLTJ SURG CYTO XM 1: CPT | Performed by: SURGERY

## 2025-06-12 PROCEDURE — 82962 GLUCOSE BLOOD TEST: CPT

## 2025-06-12 PROCEDURE — 88331 PATH CONSLTJ SURG 1 BLK 1SPC: CPT | Performed by: SURGERY

## 2025-06-12 RX ORDER — SODIUM CHLORIDE, SODIUM LACTATE, POTASSIUM CHLORIDE, CALCIUM CHLORIDE 600; 310; 30; 20 MG/100ML; MG/100ML; MG/100ML; MG/100ML
INJECTION, SOLUTION INTRAVENOUS CONTINUOUS
Status: DISCONTINUED | OUTPATIENT
Start: 2025-06-12 | End: 2025-06-12

## 2025-06-12 RX ORDER — LIDOCAINE HYDROCHLORIDE 10 MG/ML
INJECTION, SOLUTION EPIDURAL; INFILTRATION; INTRACAUDAL; PERINEURAL AS NEEDED
Status: DISCONTINUED | OUTPATIENT
Start: 2025-06-12 | End: 2025-06-12 | Stop reason: SURG

## 2025-06-12 RX ORDER — TRAMADOL HYDROCHLORIDE 50 MG/1
50 TABLET ORAL EVERY 6 HOURS PRN
Qty: 12 TABLET | Refills: 0 | Status: SHIPPED | OUTPATIENT
Start: 2025-06-12

## 2025-06-12 RX ORDER — HYDROMORPHONE HYDROCHLORIDE 1 MG/ML
0.4 INJECTION, SOLUTION INTRAMUSCULAR; INTRAVENOUS; SUBCUTANEOUS EVERY 5 MIN PRN
Status: DISCONTINUED | OUTPATIENT
Start: 2025-06-12 | End: 2025-06-12

## 2025-06-12 RX ORDER — METOCLOPRAMIDE HYDROCHLORIDE 5 MG/ML
10 INJECTION INTRAMUSCULAR; INTRAVENOUS EVERY 8 HOURS PRN
Status: CANCELLED | OUTPATIENT
Start: 2025-06-12

## 2025-06-12 RX ORDER — ONDANSETRON 2 MG/ML
INJECTION INTRAMUSCULAR; INTRAVENOUS AS NEEDED
Status: DISCONTINUED | OUTPATIENT
Start: 2025-06-12 | End: 2025-06-12 | Stop reason: SURG

## 2025-06-12 RX ORDER — METOPROLOL TARTRATE 25 MG/1
25 TABLET, FILM COATED ORAL ONCE AS NEEDED
Status: DISCONTINUED | OUTPATIENT
Start: 2025-06-12 | End: 2025-06-12 | Stop reason: HOSPADM

## 2025-06-12 RX ORDER — ONDANSETRON 2 MG/ML
4 INJECTION INTRAMUSCULAR; INTRAVENOUS EVERY 6 HOURS PRN
Status: DISCONTINUED | OUTPATIENT
Start: 2025-06-12 | End: 2025-06-12

## 2025-06-12 RX ORDER — DOCUSATE SODIUM 100 MG/1
100 CAPSULE, LIQUID FILLED ORAL 2 TIMES DAILY
Qty: 14 CAPSULE | Refills: 0 | Status: SHIPPED | OUTPATIENT
Start: 2025-06-12 | End: 2025-06-19

## 2025-06-12 RX ORDER — NALOXONE HYDROCHLORIDE 0.4 MG/ML
0.08 INJECTION, SOLUTION INTRAMUSCULAR; INTRAVENOUS; SUBCUTANEOUS AS NEEDED
Status: DISCONTINUED | OUTPATIENT
Start: 2025-06-12 | End: 2025-06-12

## 2025-06-12 RX ORDER — ACETAMINOPHEN 500 MG
1000 TABLET ORAL ONCE
Status: COMPLETED | OUTPATIENT
Start: 2025-06-12 | End: 2025-06-12

## 2025-06-12 RX ORDER — NICOTINE POLACRILEX 4 MG
15 LOZENGE BUCCAL
Status: DISCONTINUED | OUTPATIENT
Start: 2025-06-12 | End: 2025-06-12

## 2025-06-12 RX ORDER — DEXTROSE MONOHYDRATE 25 G/50ML
50 INJECTION, SOLUTION INTRAVENOUS
Status: DISCONTINUED | OUTPATIENT
Start: 2025-06-12 | End: 2025-06-12

## 2025-06-12 RX ORDER — BUPIVACAINE HYDROCHLORIDE 2.5 MG/ML
INJECTION, SOLUTION EPIDURAL; INFILTRATION; INTRACAUDAL; PERINEURAL AS NEEDED
Status: DISCONTINUED | OUTPATIENT
Start: 2025-06-12 | End: 2025-06-12 | Stop reason: SURG

## 2025-06-12 RX ORDER — DEXAMETHASONE SODIUM PHOSPHATE 4 MG/ML
VIAL (ML) INJECTION AS NEEDED
Status: DISCONTINUED | OUTPATIENT
Start: 2025-06-12 | End: 2025-06-12 | Stop reason: SURG

## 2025-06-12 RX ORDER — NICOTINE POLACRILEX 4 MG
30 LOZENGE BUCCAL
Status: DISCONTINUED | OUTPATIENT
Start: 2025-06-12 | End: 2025-06-12

## 2025-06-12 RX ORDER — ONDANSETRON 2 MG/ML
4 INJECTION INTRAMUSCULAR; INTRAVENOUS EVERY 6 HOURS PRN
Status: CANCELLED | OUTPATIENT
Start: 2025-06-12

## 2025-06-12 RX ORDER — GLYCOPYRROLATE 0.2 MG/ML
INJECTION, SOLUTION INTRAMUSCULAR; INTRAVENOUS AS NEEDED
Status: DISCONTINUED | OUTPATIENT
Start: 2025-06-12 | End: 2025-06-12 | Stop reason: SURG

## 2025-06-12 RX ORDER — HYDROMORPHONE HYDROCHLORIDE 1 MG/ML
0.2 INJECTION, SOLUTION INTRAMUSCULAR; INTRAVENOUS; SUBCUTANEOUS EVERY 5 MIN PRN
Status: DISCONTINUED | OUTPATIENT
Start: 2025-06-12 | End: 2025-06-12

## 2025-06-12 RX ORDER — HYDROMORPHONE HYDROCHLORIDE 1 MG/ML
0.6 INJECTION, SOLUTION INTRAMUSCULAR; INTRAVENOUS; SUBCUTANEOUS EVERY 5 MIN PRN
Status: DISCONTINUED | OUTPATIENT
Start: 2025-06-12 | End: 2025-06-12

## 2025-06-12 RX ORDER — ISOSULFAN BLUE 50 MG/5ML
INJECTION, SOLUTION SUBCUTANEOUS AS NEEDED
Status: DISCONTINUED | OUTPATIENT
Start: 2025-06-12 | End: 2025-06-12 | Stop reason: HOSPADM

## 2025-06-12 RX ORDER — METOCLOPRAMIDE HYDROCHLORIDE 5 MG/ML
10 INJECTION INTRAMUSCULAR; INTRAVENOUS EVERY 8 HOURS PRN
Status: DISCONTINUED | OUTPATIENT
Start: 2025-06-12 | End: 2025-06-12

## 2025-06-12 RX ADMIN — LIDOCAINE HYDROCHLORIDE 25 MG: 10 INJECTION, SOLUTION EPIDURAL; INFILTRATION; INTRACAUDAL; PERINEURAL at 12:47:00

## 2025-06-12 RX ADMIN — DEXAMETHASONE SODIUM PHOSPHATE 8 MG: 4 MG/ML VIAL (ML) INJECTION at 12:49:00

## 2025-06-12 RX ADMIN — GLYCOPYRROLATE 0.2 MG: 0.2 INJECTION, SOLUTION INTRAMUSCULAR; INTRAVENOUS at 12:49:00

## 2025-06-12 RX ADMIN — BUPIVACAINE HYDROCHLORIDE 30 ML: 2.5 INJECTION, SOLUTION EPIDURAL; INFILTRATION; INTRACAUDAL; PERINEURAL at 12:55:00

## 2025-06-12 RX ADMIN — ONDANSETRON 4 MG: 2 INJECTION INTRAMUSCULAR; INTRAVENOUS at 12:49:00

## 2025-06-12 NOTE — ANESTHESIA PROCEDURE NOTES
Airway  Date/Time: 6/12/2025 12:48 PM  Reason: Elective      General Information and Staff   Patient location during procedure: OR  Resident/CRNA: Leeanna Anderson CRNA  Performed: CRNA   Performed by: Leeanna Anderson CRNA  Authorized by: Donnie Christine DO        Indications and Patient Condition  Indications for airway management: anesthesia  Sedation level: deep      Preoxygenated: yesPatient position: sniffing    Mask difficulty assessment: 1 - vent by mask    Final Airway Details    Final airway type: supraglottic airway      Successful airway: classic  Size: 4     Number of attempts at approach: 1    Additional Comments  Dentition and oral mucosa per preop assessment, post lma insertion.

## 2025-06-12 NOTE — INTERVAL H&P NOTE
Pre-op Diagnosis: Right breast cancer    The above referenced H&P was reviewed by Tu Magana MD on 6/12/2025, the patient was examined and no significant changes have occurred in the patient's condition since the H&P was performed.  I discussed with the patient and/or legal representative the potential benefits, risks and side effects of this procedure; the likelihood of the patient achieving goals; and potential problems that might occur during recuperation.  I discussed reasonable alternatives to the procedure, including risks, benefits and side effects related to the alternatives and risks related to not receiving this procedure.  We will proceed with procedure as planned.

## 2025-06-12 NOTE — ANESTHESIA PREPROCEDURE EVALUATION
Anesthesia PreOp Note    HPI:     Mary Reese Hatchett is a 74 year old female who presents for preoperative consultation requested by: Tu Magana MD    Date of Surgery: 6/12/2025    Procedure(s):  Right breast total mastectomy, right axillary sentinel lymph node biopsy with frozen section, right breast lymphoscintigraphy  Axillary node dissection  Indication: Right breast cancer    Relevant Problems   No relevant active problems       NPO:  Last Liquid Consumption Date: 06/12/25  Last Liquid Consumption Time: 0730 (sip of water with meds)  Last Solid Consumption Date: 06/11/25  Last Solid Consumption Time: 1730  Last Liquid Consumption Date: 06/12/25          History Review:  Patient Active Problem List    Diagnosis Date Noted    Breast neoplasm, Tis (DCIS), right 08/09/2024    Ductal carcinoma in situ (DCIS) of right breast 07/10/2024    Status post left hip replacement 01/25/2022    Preoperative evaluation to rule out surgical contraindication 11/10/2021    Pulmonary HTN (HCC) 01/29/2021    Dyspnea on exertion 01/29/2021    Obstructive sleep apnea syndrome 01/29/2021    Stage 3 chronic kidney disease, unspecified whether stage 3a or 3b CKD (HCC) 10/21/2020    Stage 3a chronic kidney disease (HCC) 10/21/2020    History of hyperparathyroidism 12/10/2019    Anemia 06/15/2017    Hyperkalemia 06/15/2017    Primary osteoarthritis of both hips 06/02/2017    Pain in joint, pelvic region and thigh 03/31/2017    Edema 08/30/2016    Vitamin D deficiency, unspecified 03/15/2016    LVH (left ventricular hypertrophy) due to hypertensive disease 02/23/2016    Mitral regurgitation 02/23/2016    Tricuspid regurgitation 02/23/2016    DJD (degenerative joint disease) 02/16/2016    Essential hypertension 02/16/2016    H/O parathyroidectomy 02/16/2016    History of anemia 02/16/2016    Pure hypercholesterolemia 02/16/2016    Type 2 diabetes mellitus with diabetic chronic kidney disease (HCC) 02/16/2016       Past Medical  History[1]    Past Surgical History[2]    Prescriptions Prior to Admission[3]  Current Medications and Prescriptions Ordered in Epic[4]    Allergies[5]    Family History[6]  Social Hx on file[7]    Available pre-op labs reviewed.     Lab Results   Component Value Date    PGLU 145 (H) 06/12/2025          Vital Signs:  Body mass index is 26.7 kg/m².   height is 1.715 m (5' 7.5\") and weight is 78.5 kg (173 lb). Her oral temperature is 98.1 °F (36.7 °C). Her blood pressure is 138/84 and her pulse is 65. Her respiration is 16 and oxygen saturation is 96%.   Vitals:    06/11/25 0916 06/12/25 0822   BP:  138/84   Pulse:  65   Resp:  16   Temp:  98.1 °F (36.7 °C)   TempSrc:  Oral   SpO2:  96%   Weight: 81.6 kg (180 lb) 78.5 kg (173 lb)   Height: 1.715 m (5' 7.5\") 1.715 m (5' 7.5\")        Anesthesia Evaluation      Airway   Mallampati: II  TM distance: >3 FB  Neck ROM: full  Dental - Dentition appears grossly intact     Pulmonary - normal exam   (+) sleep apnea    ROS comment: Pulmonary micronodules: stable per last pulm note  Cardiovascular - normal exam  (+) hypertension    ROS comment: 2021 TTE:  Findings     Left ventricle:  The cavity size is normal. Wall thickness is mildly   increased. Systolic function is normal. The estimated ejection fraction is   55-60%. Wall motion is normal; there are no regional wall motion   abnormalities. Doppler parameters are consistent with abnormal left   ventricular relaxation (grade 1 diastolic dysfunction).   Right ventricle:  The cavity size is normal. Wall thickness is normal.   Systolic function is normal. Estimation of the right ventricular systolic   pressure is mildly to moderately increased.   Left atrium:  The atrium is markedly dilated.   Right atrium:  The atrium is moderately to markedly dilated.   Aortic valve:   Trileaflet; mildly calcified leaflets.  Doppler:   Transvalvular velocity is within the normal range. There is no stenosis.   There is no regurgitation.   Mitral  valve:   Structurally normal valve.    Doppler:  Transvalvular   velocity is within the normal range. There is no evidence for stenosis.   There is moderate regurgitation directed centrally.   Tricuspid valve:   Structurally normal valve.    Doppler:   There is no   evidence for stenosis.   There is mild-moderate regurgitation.   Pulmonic valve:   Not well visualized.  Doppler:  There is trace   regurgitation.   Pericardium: There is no significant pericardial effusion.   Aorta:  Aortic root: The aortic root is not dilated.   Systemic veins:   Inferior vena cava: The vessel is normal in size. The respirophasic diameter   changes are in the normal range (greater than or equal to 50%).       Neuro/Psych      GI/Hepatic/Renal    (+) chronic renal disease CRI    Endo/Other    (+) diabetes mellitus  Abdominal   (-) obese                 Anesthesia Plan:   ASA:  3  Plan:   General  Plan Comments: PECs block  Informed Consent Plan and Risks Discussed With:  Patient  Consent Comment: I have discussed the anesthetic plan, major risks and alternatives with the patient and answered all questions.  Minor and major side effects were discussed with patient, including but not limited to: injury to teeth/gums/lips, aspiration, nausea/vomiting postoperatively, anaphylaxis, heart attack, stroke, post-operative ventilation and death. The patient desires to proceed with surgery and anesthesia as planned. All questions answered.    Discussed plan with:  CRNA      I have informed Mary Reese Hatchett and/or legal guardian or family member of the nature of the anesthetic plan, benefits, risks including possible dental damage if relevant, major complications, and any alternative forms of anesthetic management.   All of the patient's questions were answered to the best of my ability. The patient desires the anesthetic management as planned.  Donnie Christine DO  6/12/2025 10:39 AM  Present on Admission:  **None**           [1]   Past Medical  History:   Acute renal failure (ARF)    BRCA gene mutation negative in female    negative for 70 gene panel+RNA through [facility] aside from VUS in [facility]. Results in media tab    CKD (chronic kidney disease) stage 3, GFR 30-59 ml/min ([facility])    Diabetes (HCC)    DJD (degenerative joint disease)    Edema    Essential hypertension    H/O parathyroidectomy    High blood pressure    High cholesterol    High potassium    History of anemia    LVH (left ventricular hypertrophy) due to hypertensive disease    Mitral regurgitation    Primary osteoarthritis of both hips    Pure hypercholesterolemia    Renal disorder    Tricuspid regurgitation    Type 2 diabetes mellitus with diabetic chronic kidney disease (HCC)    Visual impairment    glasses    Vitamin D deficiency, unspecified   [2]   Past Surgical History:  Procedure Laterality Date    Colonoscopy  04/2010    Lumpectomy right Right 09/09/2024    Lumpectomy right Right     Reexcision    Parathyroidectomy  08/2006    Root canal/non-surgical access      Total hip replacement Bilateral     2022/2023   [3]   Medications Prior to Admission   Medication Sig Dispense Refill Last Dose/Taking    Multiple Vitamins-Minerals (MULTI-VITAMIN/MINERALS) Oral Tab Take 1 tablet by mouth daily.   6/11/2025 at 10:30 PM    Cholecalciferol 125 MCG (5000 UT) Oral Tab Take 1 tablet (5,000 Units total) by mouth daily.   6/11/2025 at  4:00 PM    metoprolol tartrate 100 MG Oral Tab Take 1 tablet (100 mg total) by mouth 2 (two) times daily. 180 tablet 1 6/12/2025 at  6:30 AM    hydrALAZINE 100 MG Oral Tab Take 1 tablet (100 mg total) by mouth 3 (three) times daily. 270 tablet 1 6/12/2025 at  6:30 AM    cloNIDine 0.3 MG Oral Tab Take 1 tablet (0.3 mg total) by mouth 3 (three) times daily. 270 tablet 1 6/12/2025 at  6:30 AM    simvastatin 40 MG Oral Tab TAKE 1 TABLET BY MOUTH EVERY DAY IN THE EVENING 90 tablet 2 6/11/2025 at 10:30 PM   [4]   Current Facility-Administered Medications Ordered in Epic    Medication Dose Route Frequency Provider Last Rate Last Admin    lactated ringers infusion   Intravenous Continuous Tu Magana MD 20 mL/hr at 06/12/25 0833 New Bag at 06/12/25 0833    metoprolol tartrate (Lopressor) tab 25 mg  25 mg Oral Once PRN Tu Magana MD        ceFAZolin (Ancef) 2g in 10mL IV syringe premix  2 g Intravenous Once Tu Magana MD         No current [facility]-ordered outpatient medications on file.   [5]   Allergies  Allergen Reactions    Gadolinium Derivatives ITCHING and SWELLING     Hands swollen, abdomin itching CONTRAST DYE    Other SWELLING     Contrast dye for CT    Calcium UNKNOWN    Diltiazem OTHER (SEE COMMENTS)     Gum swelling    Lisinopril UNKNOWN    Hydrochlorothiazide W/Triamterene RASH   [6]   Family History  Problem Relation Age of Onset    Hypertension Mother     Stroke Mother 46    Heart Disease Father     Other (liver disease) Brother     Other (stomach issues) Brother     Other (hiv) Brother     Stroke Brother     No Known Problems Half-Brother     No Known Problems Half-Brother     No Known Problems Half-Brother     Alcohol and Other Disorders Associated Half-Brother     Other (HIV) Half-Brother     No Known Problems Half-Brother     Hypertension Half-Sister     Stroke Half-Sister     Other (history unknown) Half-Sister    [7]   Social History  Socioeconomic History    Marital status:    Occupational History    Occupation: Retired   Tobacco Use    Smoking status: Never    Smokeless tobacco: Never   Vaping Use    Vaping status: Never Used   Substance and Sexual Activity    Alcohol use: Yes     Comment: wine 2-3x week    Drug use: No

## 2025-06-12 NOTE — CONSULTS
Patient presents with:  Pre-Op: Preop-breast cancer    HPI:   Mary R Hatchett is a 74 year old female.   The patient presents to the office for new diagnosis right breast DCIS.     Previous breast procedures include none.     Previous mammogram was on 9/14/2022    The patient has an appointment with medical oncology Dr. Renu Mcdonald on 8/14/2024  none    Breast density: B    Bilateral breast mammogram 6/11/2024:    IMPRESSION AND RECOMMENDATION:    1. Calcifications in the right breast require additional evaluation with diagnostic mammogram.     2. No specific mammographic evidence of malignancy in the left breast.    ANY FURTHER EVALUATION SHOULD BE BASED ON CLINICAL ASSESSMENT.    The Department of Radiology has informed the patient of their mammogram results by letter or by My  Chart and will contact the patient for needed follow up studies.    ---------------------------------------------------------------------------------------  ----------------------------------------    BI-RADS Final Assessment Category 0: Incomplete-Need Additional Imaging Evaluation  Management Recommendation: Recall for additional imaging.      Right breast mammogram 7/1/2024:  FINDINGS:     Additional images of the right breast were obtained. There is persistence of a 2 to 3 mm focal  grouping of indeterminate mild calcifications in the lateral, superior, far posterior aspect right  breast. There is no associated mass or distortion.  .  ----------------------------------------  IMPRESSION AND RECOMMENDATION    A focal developing grouping of microcalcifications in the right breast for which stereotactic biopsy  is recommended. Findings were discussed with patient.    ----------------------------------------  BI-RADS Category 4: Suspicious.  Recommendation: Biopsy should be performed in the absence of clinical contraindication.      Stereotactic right breast core biopsy 7/3/2024:  Impression   IMPRESSION:    *Successful stereotactic guided  biopsy of microcalcifications at the right breast 10:00 position. A  clip was placed. A vacuum-assisted device was utilized. Results will follow.    This was interpreted by Pranav Graham M.D.     Addendum    DATE OF SERVICE: 07.03.2024  Addendum:    Findings of the right breast stereotactic biopsy 10:00 position for microcalcifications demonstrates  DCIS and ADH. The findings are concordant. Findings were discussed with patient at 8:30 AM. This can  be localized under stereotactic guidance.    BI-RADS Category 6: Known Biopsy-Proven Malignancy  Recommendation: Surgical management as clinically appropriate.      This was interpreted by Pranav Graham M.D.     Final Diagnosis   Right breast, calcifications, 10:00, posterior, stereotactic needle core biopsy:  -Ductal carcinoma in situ (DCIS).  -Nuclear grade 1, cribriform type with dystrophic calcifications and necrosis.  -Largest focus of DCIS measures 5 mm (0.5 cm); 5% of submitted tissue.  -Atypical ductal hyperplasia (ADH).       Addendum 1   Quantitative Immunohistochemistry:   Material: Block A3  Population: Tumor Cells    Antibody & Clone Result Interpretation   Estrogen Receptor - SP1 >95 % Positive Cells Strong Positive   Progesterone Receptor - 16 2 % Positive Cells Strong Positive       8/7/2020 plan: Patient resents for follow-up evaluation for preop discussion for right breast DCIS.  Laboratory data chest x-ray on 7/11/2024: CBC normal, CMP normal GFR 35  Laboratory data 7/12/2024: BMP GFR 47.25    BRCA testing:pending    Breast Bra size:42 C    The patient did not see medical oncology or radiation oncology  The patient is scheduled to see pulmonology next week     PA lateral chest x-ray on 7/11/2024:  mpression  IMPRESSION:  Please see above regarding the right lung base density laterally.     CT scan of chest on 7/18/2024:  Impression   IMPRESSION:  * Small right pleural effusion with compressive atelectasis in the right lower lobe, accounting  for  radiograph described abnormality.    * Enlarged right hilar and mediastinal lymph nodes. While findings may reflect reactive lymphoid  hyperplasia, metastasis cannot be excluded. 3 months contrast enhanced CT chest follow-up is  recommended.    * Stable pulmonary micronodules.     MRI of the breast 7/12/2024:  mpression   IMPRESSION:    1. Biopsy-proven ductal carcinoma in situ right breast posterior one third aspect junction of the  upper outer, lower outer quadrant.    2. Right breast posterior third aspect approximately 6:00 position linear nonmasslike enhancement.  This is indeterminate and in the setting of breast conservation requires further evaluation with MRI  guided core needle biopsy.  Within the right breast posterior one third aspect 6:00 position approximately 3.6 cm anterior   medial to the biopsy-proven site of DCIS there is 1.8 cm linear nonmasslike enhancement demonstrated   on axial series slice 51, sagittally slice 234. This demonstrates rapid initial uptake with   persistent enhancement kinetics.     3. No MR evidence of malignancy in the left breast.    4. Asymmetric right-sided pleural thickening and associated enhancement. Conjunction with nodular  density demonstrated on prior chest radiograph, recommend further evaluation with chest CT scan.    BI-RADS Final Assessment Category 4: Suspicious.  Management Recommendation: Biopsy Should Be Performed in the Absence of Clinical Contraindication.     Right breast MRI guided core biopsy 7/22/2024:  Impression   IMPRESSION:   1. Successful right breast MRI guided, vacuum assisted core needle biopsy.  2. Successful marker clip placement with confirmation by right digital diagnostic mammogram.  3. See addendum for radiology pathology correlation.     Addendum    DATE OF SERVICE: 07.22.2024  PATHOLOGY ADDENDUM * PATHOLOGY ADDENDUM     Addendum, 7/25/2024 9:12 AM    Final pathology of DCIS nuclear grade 2, ER-positive TX negative is concordant  with imaging  findings. Surgical consult is recommended. Patient is under the care of Dr. Magana and has  follow-up with him on August 7,2024.    Above was discussed with the patient by Dr. Olivares on 7/25/2024.    PATHOLOGY ADDENDUM ENDS     Final Diagnosis   Right breast, lower central, MRI-guided core needle biopsy:  - Ductal carcinoma in situ (DCIS).   - Nuclear grade 2, cribriform type, with comedonecrosis and associated microcalcifications.   - Largest focus of DCIS measures 21 mm (2.1 cm); 10% of submitted tissue.        Addendum 1   Quantitative Immunohistochemistry:   Material: Block A1  Population: DCIS    Antibody & Clone Result Interpretation   Estrogen Receptor - SP1 >95 % Positive Cells strong Positive   Progesterone Receptor - 16 0 % Positive Cells Negative       9/17/2024: Patient presents for follow-up evaluation status post right breast lumpectomy with wire localization for DCIS with 2 separate sites on 9/9/2024.  Patient with complaints of none    Pathology reveals 1 cm a large focus of DCIS 8 cm in size close margins at the anterior skin superior margin less than 1 mm, inferior margin less than 1 mm  The patient saw Dr. Mcdonald preoperatively  Patient scheduled to see radiation oncology Dr. Canseco next Wednesday.  Patient is scheduled to see Dr. Mcdonald in 2 weeks.    Final Diagnosis   A- Right breast, lumpectomy:  -Ductal carcinoma in situ (DCIS) measuring 80 mm (8 cm) in the medial to lateral dimension.   -Nuclear grades 1 and 2, solid, cribriform and micropapillary type.   -Associated necrosis and microcalcifications.   -Atypical ductal hyperplasia (ADH).   -DCIS is less than 1 mm from the anterior margin.   -See specimens B-F for all remaining final margins.   -Biopsy site changes and clips are present.   -Background breast tissue with microcysts and columnar cell change.     B- Right breast, lateral margin, excision:  -Ductal carcinoma in situ (DCIS), multiple foci measuring up to 4 mm in  greatest extent.   -DCIS is less than 1 mm from the lateral margin.     C- Right breast, superior margin, excision:  -Ductal carcinoma in situ (DCIS) present at less than 1 mm from the superior margin.   -Deeper sections examined.     D- Right breast, inferior margin, excision:  -Ductal carcinoma in situ (DCIS), multiple foci measuring up to 5 mm in greatest extent.   -DCIS is less than 1 mm from the inferior margin.     E- Right breast, medial margin, excision:  -Fibrovascular and adipose tissue.   -New margin, negative for in situ carcinoma.     F- Right breast, deep margin, excision:  -Fibrovascular and adipose tissue.   -New margin, negative for in situ carcinoma.         [facility] OPERATIVE REPORT:     PATIENT NAME: Mary R Hatchett  : 1950   MRN: [id number]  SITE: [facility]      DATE OF OPERATION: 2024    PREOPERATIVE DIAGNOSIS: Right breast ductal carcinoma-in-situ (DCIS) multifocal multicentric with 2 separate sites  preoperative clinical stage 0      POSTOPERATIVE DIAGNOSIS: Same right  breast ductal carcinoma-in-situ (DCIS) multifocal multicentric 2 separate sites  postoperative clinical stage 0    PROCEDURE PERFORMED: Right breast lumpectomy with intraoperative lymphatic mapping and sentinel lymph node dissection, with intraoperative specimen mammogram 2 separate sites separate localizations    SURGEON: Tu Magana MD    ASST: DILLON Alanis (Assistant helped position patient and helped with positioning, retraction, suturing, closure, dressings etc.)    ANESTHESIA: General anesthesia     ESTIMATED BLOOD LOSS: 10 ml         10/9/2024: Patient presents for follow-up evaluation status post right breast lumpectomy with wire localization for DCIS with 2 separate sites on 2024.  Patient with complaints of none   Pathology reveals 1 cm a large focus of DCIS 8 cm in size close margins at the anterior skin superior margin less than 1 mm, inferior margin less than 1 mm and  lateral margin less than 1 mm  The patient saw Dr. Mcdonald preoperatively  Patient saw radiation oncology Dr. Canseco who recommended reexcision.   Patient saw Dr. Mcdonald who recommended reexcision.  She says her priority is reexcision and then will think over her options regarding both radiation and adjuvant endocrine therapy     10/25/2024:Patient presents for follow-up evaluation status post right breast lumpectomy with wire localization for DCIS with 2 separate sites on 2024 and reexcision on 10/15/2024:  Patient with complaints of none     Pathology   Final Diagnosis   Right breast, re-excision lumpectomy:  -Ductal carcinoma in situ (DCIS) measuring up to 7 mm (0.7 cm) on a single slide.  -Nuclear grades 1 and 2, cribriform and solid types.  -Associated necrosis and microcalcifications.  -DCIS is present on 4 of 15 slides examined.  -DCIS is present < 1 mm (<0.1 cm) from the medial and superior margins and 1 mm (0.1 cm) from the posterior margin. (See comment)  -One intramammary lymph node, negative for carcinoma (0/1).  -Background breast tissue with atypical ductal hyperplasia (ADH) and intraductal papilloma.  -Unremarkable skin.       The patient saw Dr. Mcdonald preoperatively  Patient saw radiation oncology Dr. Canseco who recommended reexcision.   Patient saw Dr. Mcdonald    She says her priority is reexcision and then will think over her options regarding both radiation and adjuvant endocrine therapy     [facility]PERATIVE REPORT:     PATIENT NAME: Mary R Hatchett  : 1950   MRN: [id number]  SITE: [facility]      DATE OF OPERATION: 10/15/2024    PREOPERATIVE DIAGNOSIS: Right breast ductal carcinoma-in-situ (DCIS) close margin  preoperative clinical stage 0      POSTOPERATIVE DIAGNOSIS: Same Right  breast ductal carcinoma-in-situ (DCIS) close margins  postoperative clinical stage 0    PROCEDURE PERFORMED: Right breast reexcision lumpectomy    SURGEON: Tu Magana MD    ASST: Jazmine Coon  CSA(Assistant helped position patient and helped with positioning, retraction, suturing, closure, dressings etc.)    ANESTHESIA: General anesthesia     ESTIMATED BLOOD LOSS: 5 ml      5/9/2025: Patient presents for follow-up evaluation status post right breast lumpectomy with wire localization for DCIS with 2 separate sites on 9/9/2024 and reexcision on 10/15/2024:  Patient recently underwent a right breast ultrasound-guided core biopsy for a new right breast mass on 5/2/2025.  Pathology reveals recurrent DCIS of the right breast. Second biopsy reveals atypical glandular proliferation atypical ductal hyperplasia.  Patient with complaints of none     Patient had close reexcision of margins with DCIS  Patient0 right breast cancer.  Patient did undergo radiation therapy. Completed 1/23/2025 at [facility]  Patient is on antiestrogen therapy with Dr. Renu Mcdonald     Bilateral breast mammogram 4/23/2025:  IMPRESSION AND RECOMMENDATION:  Indeterminate right breast microcalcifications for which a 2 site representative stereotactic core  needle biopsy is recommended for further assessment. Given location within the inferior breast,  recommend biopsy on the prone biopsy unit. Recommend biopsy of the most anterior and posterior  aspect of the indeterminate microcalcifications. These findings were discussed with the patient by  Dr. Paz. The patient will be assisted in scheduling the biopsy and the referring physician will  be informed of the results and recommendations.    BI-RADS CATEGORY 4: SUSPICIOUS ABNORMALITY--BIOPSY SHOULD BE CONSIDERED:    The Department of Radiology has informed the patient of their results by letter and will contact the  patient for needed follow-up studies.      Right breast ultrasound-guided core biopsy x 2 on 5/4/2025:  Impression    Impression   IMPRESSION:   1. Successful right breast stereotactic, vacuum assisted core needle biopsy of calcifications in   the central outer breast at posterior  depth with placement of a stoplight shaped marker clip.   2. Successful right breast stereotactic, vacuum assisted core needle biopsy of calcifications in   the central outer breast at middle depth with placement of a tophat shaped marker clip.   3. Successful marker clip placements with confirmation by right digital diagnostic mammogram.   4. See addendum for radiology pathology correlation.     BI-RADS Final Assessment Category: Post Procedure Mammograms for Marker Placement.   Management Recommendation: Assess radiologic/pathologic concordance.   [facility], [place], [address]     Pathology:  inal Diagnosis   A. Right breast, central outer, posterior, with calcifications, stoplight clip, stereotactic needle core biopsy:  -Ductal carcinoma in situ (DCIS).   -Nuclear grade 1-2, cribriform type, with comedonecrosis and associated microcalcifications.   -Atypical ductal hyperplasia.  -Benign breast tissue with associated microcalcifications.    B. Right breast, central outer, mid depth, with calcifications,tophat clip, stereotactic needle core biopsy:  -Atypical glandular proliferation and atypical intraductal proliferation, please see comment.  -Foreign body giant cell reaction with associated microcalcifications.         Electronically signed by Anita Rodas on 5/9/2025 at 10:30 AM     Ancillary Studies [facility]   Quantitative Immunohistochemistry:   Material: Block A1  Population: DCIS    Antibody & Clone Result Interpretation   Estrogen Receptor - SP1 >95 % Positive Cells strong Positive   Progesterone Receptor - 16 0 % Positive Cells Negative       5/23/2025: Patient presents for preop discussion of recurrent right breast cancer.    The patient did see Dr. Mcdonald on 5/15/2025:  Who has recommended mastectomy   Patient with complaints of none    The patient is decided on right-sided mastectomy. Patient is contemplating reconstruction.    Bilateral breast MRI on  2025:  IMPRESSION      1. Post treatment changes of the right breast. 2 biopsy clip artifacts are seen from recent 2 site  stereotactic biopsy. There is no significant enhancement seen around both of these clips. Surgical  consult continues to be recommended.  2. No other abnormal areas of enhancement seen bilaterally.  3. No significant adenopathy seen.    BI-RADS Category 6: Known Biopsy-Proven Malignancy  Recommendation: Surgical management as clinically appropriate.        Gyne History:  Menarche at age 10.   Age at birth of 1st child: none.   Number of pregnancies: .   Patient has history of breast feeding na.   Menopause at age 53.   Hormone replacement history: none.   Oral contraceptive pills taken for 12.   Family history of breast or ovarian cancer: none.    Allergies:    Diltiazem OTHER (SEE COMMENTS)  Comment:Gum swelling  Gadolinium Derivati* ITCHING, SWELLING  Comment:Hands swollen, abdomin itching CONTRAST DYE  Hydrochlorothiazide* RASH  Lisinopril UNKNOWN  Comment:Pt does not remember   Current Meds:  Current Outpatient Medications   Medication Sig Dispense Refill   cloNIDine 0.3 MG Oral Tab Take 1 tablet (0.3 mg total) by mouth 3 (three) times daily. 270 tablet 3   metoprolol tartrate 100 MG Oral Tab Take 1 tablet (100 mg total) by mouth 2 (two) times daily. 180 tablet 3   simvastatin 40 MG Oral Tab Take 1 tablet (40 mg total) by mouth every evening. 90 tablet 3   hydrALAZINE 100 MG Oral Tab Take 1 tablet (100 mg total) by mouth 3 (three) times daily. 270 tablet 3   Cholecalciferol (VITAMIN D3) 1000 units Oral Cap Take by mouth.   Multiple Vitamins-Minerals (MULTI-VITAMIN/MINERALS) Oral Tab Take 1 tablet by mouth daily.   predniSONE 10 MG Oral Tab Take 1 tablet (10 mg total) by mouth As Directed. Take 5 tabs 13 hours prior to exam Take 5 tabs 7 hours prior to exam Take 5 tabs 1 hour prior to exam (Patient not taking: Reported on 2025) 15 tablet 0   diphenhydrAMINE HCl 50 MG Oral Tab  Take 1 tablet (50 mg total) by mouth As Directed. Take 1 tablet by mouth 1 hour prior to exam (Patient not taking: Reported on 5/23/2025) 1 tablet 0   anastrozole 1 MG Oral Tab tab Take 1 tablet (1 mg total) by mouth daily. (Patient not taking: Reported on 5/23/2025) 90 tablet 1       HISTORY:  Past Medical History:   Diagnosis Date   Acute renal failure (ARF) ([facility])   Breast cancer (HCC)   Cancer (HCC)   breast   CKD (chronic kidney disease) stage 3, GFR 30-59 ml/min ([facility]) 02/16/2016   Diabetes ([facility])   DIET CONTROLLED   Disorder of kidney and ureter   CKD3   DJD (degenerative joint disease) 02/16/2016   Edema 08/30/2016   Essential hypertension 02/16/2016   H/O parathyroidectomy 02/16/2016   High blood pressure   High cholesterol   High potassium   History of anemia 02/16/2016   Hyperlipidemia   LVH (left ventricular hypertrophy) due to hypertensive disease 02/23/2016   Mitral regurgitation 02/23/2016   Osteoarthritis   Peripheral polyneuropathy 04/28/2022   Primary osteoarthritis of both hips 06/02/2017   Pure hypercholesterolemia 02/16/2016   Tricuspid regurgitation 02/23/2016   Type 2 diabetes mellitus with diabetic chronic kidney disease (HCC) 02/16/2016   Vitamin D deficiency, unspecified 03/15/2016     Past Surgical History:   Procedure Laterality Date   COLONOSCOPY 04/2010   COLONOSCOPY STOMA DX INCLUDING COLLJ SPEC SPX   HIP REPLACEMENT SURGERY Bilateral   LUMPECTOMY RIGHT 09/09/2024   re excision on 10/15/24 dcis,, adh,   NEEDLE BIOPSY RIGHT   PARATHYROIDECTOMY 08/2006   RADIATION RIGHT   ROOT CANAL/NON-SURGICAL ACCESS     Family History   Problem Relation Age of Onset   Heart Disease Father   Hypertension Mother   Stroke Mother   Stroke Sister   No Known Problems Brother   No Known Problems Brother   No Known Problems Brother   Alcohol and Other Disorders Associated Brother   Other (HIV) Brother   No Known Problems Brother   Hypertension Sister   Other (history unknown) Sister     Social History  Tobacco  Use  Smoking status: Never  Smokeless tobacco: Never  Vaping Use  Vaping status: Never Used  Alcohol use: Not Currently  Comment: 1 drink/week  Drug use: No      ROS:   GENERAL HEALTH: otherwise feels well; no weight loss  SKIN: denies any unusual skin lesions or rashes  EYES: no visual complaints or deficits  HEENT: denies nasal congestion, sinus pain or sore throat; hearing loss negative  RESPIRATORY: denies shortness of breath, wheezing or cough   CARDIOVASCULAR: denies chest pain or JJ; no palpitations   GI: denies nausea, vomiting, constipation, diarrhea; no rectal bleeding; no heartburn  MUSCULOSKELETAL: no joint complaints upper or lower extremities, no back or rib pain  NEURO: no sensory or motor complaint  PSYCHE: no symptoms of depression or anxiety  HEMATOLOGY: denies hx anemia; denies bruising or excessive bleeding  ENDOCRINE: denies excessive thirst or urination; denies unexpected wt gain or wt loss  ALLERGY/IMM.: denies food or seasonal allergies    PHYSICAL EXAM:   General Examination:   NECK/THYROID: neck supple, full range of motion, no cervical lymphadenopathy.   LYMPH NODES: no axillary, supraclavicular adenopathy.   HEART: no murmurs, regular rate and rhythm,   LUNGS: clear to auscultation bilaterally.   ABDOMEN: normal, bowel sounds present, soft, nontender, nondistended, no ascites, no hepatosplenomegaly.   EXTREMITIES: No clubbing cyanosis or edema  BREAST EXAM:  Axillary Lymph Nodes: No supraclavicular, infraclavicular or axillary adenopathy present  Breast: LEFT BREAST: No palpable discrete mass present.   RIGHT BREAST: biopsy site high upper outer 11:00  No hematoma or ecchymosis .  No palp discrete mass present    Nipple: DISCHARGE: None. RETRACTION: None. SKIN CHANGES: None.   8/7/2024: Right breast no palpable discrete mass present. Biopsy wounds healed. No hematoma or ecchymosis present.  9/17/2024: Right breast lumpectomy wound healing well. Steri-Strips in place. No evidence of  infection. No hematoma.    10/9/2024: Right breast lumpectomy wound healing well. Steri-Strips in place. No evidence of infection. No hematoma.  10/25/2024: Right breast lumpectomy wound healing well. Steri-Strips in place. No hematoma. No seroma. No evidence of infection.  5/9/2025: Right breast lumpectomy changes and radiation changes present. Biopsy site at the 7:00 location with Steri-Strips in place mild ecchymosis. No hematoma.0  5/23/2025: Right breast lumpectomy and radiation changes present. There is firmness on the outside of the lateral right breast at the previous lumpectomy site. There is mild radiation changes involving the skin. No other palp discrete mass present. There is accessory nipple at the 6:00 location of the inframammary fold.    ASSESSMENT/ PLAN:   Mary R Hatchett is a 74 year old female with a diagnosis of right breast DCIS x 2.  Patient status post right breast lumpectomy wire localization x 2 on 9/9/2024.  Pathology reveals a very large single focus of 8 cm of DCIS. No invasive cancer present. Margins are close less than 1 mm inferior and superior margins.  The patient did undergo radiation therapy and completed this at [facility] on 1/23/2025.  Patient is on Arimidex under the direction of Dr. Mcdonald.  Patient had recent new right breast microcalcifications spanning 3.8 cm. Ultrasound-guided core biopsy x 2 reveals recurrent DCIS as well as atypical ductal hyperplasia.  I had extensive discussion with the patient as well as her friend as etiology of recurrent breast cancer. I discussed that the patient prior radiation therapy cannot have any further radiation therapy. Patient will require mastectomy.  I have reviewed the most recent bilateral breast MRI which reveals 2 foci of DCIS and ADH present with no other enhancement around the surrounding areas.  Given recurrent breast cancer patient is agreeing to right-sided mastectomy.  Patient's friend and the patient are discussing  possible reconstructive options.  Given the prior radiation immediate reconstruction may not be an option however I would recommend the patient have evaluation by plastic surgery Dr. Williamson to further evaluate if reconstruction immediately or would be delayed. Once the patient has had evaluation Dr. Williamson she will let us know her final decision about reconstructive options.  I did discuss that since the patient is having a mastectomy I will discuss with Dr. Mcdonald about the need for sentinel lymph node biopsy. DCIS does not metastasized to the lymph nodes however if the patient is having a mastectomy typically the patient would have a sentinel lymph node biopsy at the time of the mastectomy. Patient did have 1 intramammary lymph node removed with her previous reexcision of the lumpectomy.  If decision by Dr. Mcdonald for not to have sentinel lymph node biopsy performed would proceed with total breast mastectomy.  I discussed the pathology results with the patient. had a detailed discussion with Pt and family's concerning the biopsy results, and breast cancer in detail. I explained the radiology results of breast imaging. I explained breast cancer invasive vs DCIS (noninvasive breast cancer) and treatment options. I explained possible multifocal disease, multiple cancers and DCIS with significant increased risk in certain cases of local recurrence or second cancer. I discussed options of treatment, we discussed option of mastectomy, bilateral mastectomy, lumpectomy with usual radiation. I discussed possible outcomes of mastectomy and lumpectomy of deformity and possible reconstruction options. We discussed possible sentinel lymph node biopsy and risks including not 100% accurate, i.e. false negatives. Discussed option and controversy concerning following at same surgery with standard node dissection is sentinel lymph nodes positive. However, Pt understand significant risk of lymphedema, swelling, pain, numbness,  deformity of chest wall, increased risk of infection Attention will be made during the operation to obtain adequate surgical margins. The patient understands the indications, details, and potential risks and complications including bleeding, infection, seroma, 8% risk of local recurrence, 3 to 5% chance of lymphedema, positive margins requiring re excision, cosmetic changes involving the lumpectomy site, need for possible completion axillary lymph node dissection if axillary lymph node is involved by cancer, as well as the risks of the procedure including but not limited to bleeding, infection, non healing wound, paraesthesia at the excision site, missed diagnosis, delayed diagnosis, inability to diagnosis, need for further surgery if malignancy is present at the surgical margin or involving the lymph node; as well as the risks with anesthesia including myocardial infarction, respiratory failure, renal failure, stroke, pulmonary embolism, deep vein thrombosis, and even death were discussed in detail with the patient who understands, consents, and wishes to proceed with surgery. We will plan right breast mastectomy with possible right axillary sentinel lymph node biopsy with frozen section possible right axillary lymph node dissection under general anesthetic at [facility] on pending decision about reconstructive options.  I spent 40 minutes on this patient visit. This included: preparing to see patient, obtaining history, reviewing separately obtained history, performing physical examination, independently interpreting results and discussing with patient, patient and family counseling, referring and communicating with other healthcare professionals, and care coordination

## 2025-06-12 NOTE — OPERATIVE REPORT
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)   preoperative clinical stage 0       POSTOPERATIVE DIAGNOSIS: Same recurrent Right breast ductal carcinoma-in-situ (DCIS)    postoperative clinical stage 0    PROCEDURE PERFORMED: Right breast total mastectomy with partial chest wall resection with intraoperative lymphatic mapping and sentinel lymph node biopsy,      SURGEON:  Tu Magana MD    ASST:  DILLON Alanis (Assistant helped position patient and helped with positioning, retraction, suturing, closure, dressings etc.)    ANESTHESIA: General anesthesia     ESTIMATED BLOOD LOSS:   25 ml    COMPLICATIONS: none     INDICATIONS:   This is a 74 year old female  with a history of recurrent right breast cancer. I have had multiple discussions with the patient as to the etiology of the above. We discussed options of modified radial mastectomy versus breast conservation therapy. I discussed the risks for local recurrence, long-term survival being equal as well as the need for postoperative radiation therapy if breast conversation is decided on. The patient has decided on  right breast mastectomy with sentinel lymph node biopsy .   Discussed concerns with positive margins and need for further axillary lymph node dissection if lymph nodes are positive. The risks of procedure including bleeding, infection, postoperative hematoma, wound infection, nonhealing wound, scar formation, cosmetic changes involving the incision site, need for further surgery if margins are positive or lymph nodes are positive, inability to completely excise the tumor, wound infection, seroma formation, 3 to 5% incidence of lymphedema, as well as risks with anesthesia including myocardial infarction, respiratory failure,  renal failure, pulmonary embolus, DVT and even death were all discussed in detail with the patient family member   who both understand, consent and wish to proceed with the operation.    OPERATION:  The patient was brought to the operating room and placed on the operating room table in the supine position. The patient had lymphoscintigraphy with Nuclear Medicine injection preoperatively. General anesthetic was administered via LMA by Anesthesia. The patient's Right breast, axillary and arm was prepped and draped in routine sterile fashion. Prior to prepping and draping, 5 mL of Lymphazurin blue was injected in the subareolar region using sterile technique. The breast was massaged for 15 minutes.   The 10-second skin count in the Right  axilla was 134.   Next a curvilinear incision was made in the Right  breast excising the nipple areolar complex with a #10 blade.  The mastectomy was to be completed.  Dissection continued down through subcutaneous tissue using electrocautery.  Superior and inferior inferior skin flaps were made using electrocautery.  Care was taken to avoid injury to skin flaps.  Dissection continued down to the pectoralis fascia and pectoralis muscle.  2 previous lumpectomies sites were identified.  The prior scar and radiation of the chest wall at the lumpectomy site was adherent to the pectoralis muscle.  Due to recurrent cancer resection of the pectoralis muscle in this area underneath the previous lumpectomy site was performed electrocautery removing part of the muscle.    At this point the breast tissue as well as the pectoralis fashion was elevated off the pectoralis muscle with electrocautery.  This extended out laterally to the axilla and the tail of the breast.  The breast was removed and oriented with sutures in the tail of the breast.  Next the Right axillary sentinel lymph node biopsy was to be completed.  The clavipectoral fascia was divided with the LigaSure device.  A blue lymph node  was identified. The 10-second in vivo count was 1826. The lymph node was excised with LigaSure. The 10-second ex vivo count was 1368. This was sent as sentinel lymph node #1. The 10-second basin was 635. The second lymph node was identified and this was excised with LigaSure. This lymph node was completely excised. The 10-second ex vivo count was 829. This was sent at sentinel lymph node #2, which was blue and sent to Pathology. The 10-second basin count was 229.  A third sentinel lymph node was identified and this was excised with the LigaSure.  The 10-second ex vivo count was 172.  This was sent out as sentinel lymph node 3.  Lymph node was blue.  The patient was 29..  A total of 3 sentinel lymph nodes were retrieved and sent to pathology. There were no other palpable abnormalities or blue lymph nodes in the axilla. The wound was irrigated thoroughly with saline solution. There was no active bleeding present.   Frozen section of the sentinel lymph node biopsies was completed.  Frozen section revealed no evidence of metastatic disease     At this time two 15 Greek round Gaurang drains were placed 1 into the axilla and 1 in the subcutaneous tissue and brought through separate stab wounds on the Right chest wall.  The drains were secured in place with 2-0 silk sutures.  The subcutaneous tissue was approximated with 2-0 Vicryl simple interrupted sutures the entire course of the incision.  Care was taken to avoid  a dogear.  The skin was approximated with a 3-0 V lock 180-day running suture in multiple short segments.  Dermabond was applied to the incision with Steri-Strips.       Sterile dressings were applied to the wounds. Compression mammary support was applied to the patient   The patient was transferred off the operating room table and taken to the recovery room extubated and in stable condition. All counts were correct at the end of the case.  The role of my assistant was critical in the fact that they acted in  retraction, exposure, the breast and axilla, as well as aiding in wound closure.    [place] Node Biopsy for Breast Cancer - Right  Operation performed with curative intent. Yes   Tracer(s) used to identify sentinel nodes in the upfront surgery (non-neoadjuvant) setting (select all that apply). Dye and Radioactive tracer   Tracer(s) used to identify sentinel nodes in the neoadjuvant setting (select all that apply). N/A   All nodes (colored or non-colored) present at the end of a dye-filled lymphatic channel were removed. Yes   All significantly radioactive nodes were removed. Yes   All palpably suspicious nodes were removed. Yes   Biopsy-proven positive nodes marked with clips prior to chemotherapy were identified and removed. N/A                   GONZALES AGUILAR JR., MD. [facility]  GENERAL SURGERY  [facility]    6/12/2025  2:57 PM  Juan Valle MD

## 2025-06-12 NOTE — DISCHARGE INSTRUCTIONS
Dr. Tu Magana     [phone]    DISCHARGE INSTRUCTIONS Breast Mastectomy surgery      Activity can be resumed as tolerated including walking, stairs, light exercise and driving short distances.  Heavy lifting (i.e. objects > 15-20 lbs.) is to be avoided for 3-4 weeks.  Normal time off work is one to two weeks.     Incisional  pains are commonly of moderate intensity in the first 24-48 hours and gradually improve over the initial post-operative week.  Please take tylenol extra strength 2 500mg tabs every 8 hours around the clock.  Also take Aleve 1 tablets every 12 hours around the clock.   Prescription pain medication (Tramadol) should be used initially according to the pain experienced and gradually weaned over the next few days.  Prescription pain medication can make you nauseated, light-headed and constipated: it should be taken with food and discontinued if side-effects develop.  A prescription for  stool softener (Colace) is helpful to avoid constipation. Take 1 orally twice a day. If  no bowel movement within 48 hours, Milk of Magnesia (MOM) 30 mls may be helpful.    Your diet should consist primarily of liquids until you have a good appetite, usually the first day after surgery.  Fatty or fried foods should be avoided in the first week or two after surgery but subsequently a general diet may be resumed.    You have 2 drains in place (4 total if having a bilateral mastectomy).  These should be milked/stripped twice a day. Record the outputs of the drains each time you milk them.  Nurses will show you how to do this prior to discharge.    Change the dressing over her incision and drain sites daily.  Cover with light gauze.    Sponge bath only.    Where good mammary support such as a support bra or the breast support that was given to you in the hospital, day and night for 1 week.    Activity after surgery  After surgery, take it easy for the rest of the day. If you had general anesthesia, don’t use  machinery or power tools, drink alcohol, or make any major decisions for at least the first 24 hours.  Don’t drive while you are still taking opioid pain medication, and don’t drive u.ntil you are able to step firmly on the brake pedal without hesitation.  Ask others to help with chores and errands while you recover.  Don’t lift anything heavier than 10 pounds until your doctor says it’s OK.  Don’t mow the lawn, shovel snow, use a vacuum , or do other strenuous activities until your doctor says it’s okay.  Walk as often as you feel able.  Continue the coughing and deep breathing exercises that you learned in the hospital.  Ask your doctor when you can expect to return to work.  Avoid constipation:  Eat fruits, vegetables, and whole grains   Drink 6-8 glasses of water a day, unless otherwise instructed.  Use a laxative or a mild stool softener as instructed by your doctor.  Call your doctor, or the 24-hour answering service, immediately if you have any of the following:  Yellowing of your eyes or skin (jaundice)  Chills  Fever above 101.5°F or 38.5°C    Redness, swelling, increasing pain, pus, or a foul smell at the incision site  Dark or rust-colored urine  Stool that is kirill-colored or light in color instead of brown  Increasing abdominal pain  persistent nausea or bowel problems, uncontrolled pain or poor appetite     Contact the office tomorrow to make a follow appointment for 10-14 days after surgery, on 6/25/25.    Instructions given by DILLON Alanis MD. [facility]  GENERAL SURGERY  [facility]  OFFICE [phone]    6/12/2025  12:04 PM       HOME INSTRUCTIONS  [facility] HOME CARE INSTRUCTIONS: POST-OP ANESTHESIA  The medication that you received for sedation or general anesthesia can last up to 24 hours. Your judgment and reflexes may be altered, even if you feel like your normal self.      We Recommend:   Do not drive any motor vehicle or bicycle   Avoid mowing the lawn,  playing sports, or working with power tools/applicances (power saws, electric knives or mixers)   That you have someone stay with you on your first night home   Do not drink alcohol or take sleeping pills or tranquilizers   Do not sign legal documents within 24 hours of your procedure   If you had a nerve block for your surgery, take extra care not to put any pressure on your arm or hand for 24 hours    It is normal:  For you to have a sore throat if you had a breathing tube during surgery (while you were asleep!). The sore throat should get better within 48 hours. You can gargle with warm salt water (1/2 tsp in 4 oz warm water) or use a throat lozenge for comfort  To feel muscle aches or soreness especially in the abdomen, chest or neck. The achy feeling should go away in the next 24 hours  To feel weak, sleepy or \"wiped out\". Your should start feeling better in the next 24 hours.   To experience mild discomforts such as sore lip or tongue, headache, cramps, gas pains or a bloated feeling in your abdomen.   To experience mild back pain or soreness for a day or two if you had spinal or epidural anesthesia.   If you had laparoscopic surgery, to feel shoulder pain or discomfort on the day of surgery.   For some patients to have nausea after surgery/anesthesia    If you feel nausea or experience vomiting:   Try to move around less.   Eat less than usual or drink only liquids until the next morning   Nausea should resolve in about 24 hours    If you have a problem when you are at home:    Call your surgeons office       Discharge Instructions: After Your Surgery  You’ve just had surgery. During surgery, you were given medicine called anesthesia to keep you relaxed and free of pain. After surgery, you may have some pain or nausea. This is common. Here are some tips for feeling better and getting well after surgery.   Going home  Your healthcare provider will show you how to take care of yourself when you go home. They'll  also answer your questions. Have an adult family member or friend drive you home. For the first 24 hours after your surgery:   Don't drive or use heavy equipment.  Don't make important decisions or sign legal papers.  Take medicines as directed.  Don't drink alcohol.  Have someone stay with you, if needed. They can watch for problems and help keep you safe.  Be sure to go to all follow-up visits with your healthcare provider. And rest after your surgery for as long as your provider tells you to.   Coping with pain  If you have pain after surgery, pain medicine will help you feel better. Take it as directed, before pain becomes severe. Also, ask your healthcare provider or pharmacist about other ways to control pain. This might be with heat, ice, or relaxation. And follow any other instructions your surgeon or nurse gives you.      Stay on schedule with your medicine.     Tips for taking pain medicine  To get the best relief possible, remember these points:   Pain medicines can upset your stomach. Taking them with a little food may help.  Most pain relievers taken by mouth need at least 20 to 30 minutes to start to work.  Don't wait till your pain becomes severe before you take your medicine. Try to time your medicine so that you can take it before starting an activity. This might be before you get dressed, go for a walk, or sit down for dinner.  Constipation is a common side effect of some pain medicines. Call your healthcare provider before taking any medicines, such as laxatives or stool softeners, to help ease constipation. Also ask if you should skip any foods. Drinking lots of fluids and eating foods, such as fruits and vegetables, that are high in fiber can also help. Remember, don't take laxatives unless your surgeon has prescribed them.  Drinking alcohol and taking pain medicine can cause dizziness and slow your breathing. It can even be deadly. Don't drink alcohol while taking pain medicine.  Pain medicine  can make you react more slowly to things. Don't drive or run machinery while taking pain medicine.  Your healthcare provider may tell you to take acetaminophen to help ease your pain. Ask them how much you're supposed to take each day. Acetaminophen or other pain relievers may interact with your prescription medicines or other over-the-counter (OTC) medicines. Some prescription medicines have acetaminophen and other ingredients in them. Using both prescription and OTC acetaminophen for pain can cause you to accidentally overdose. Read the labels on your OTC medicines with care. This will help you to clearly know the list of ingredients, how much to take, and any warnings. It may also help you not take too much acetaminophen. If you have questions or don't understand the information, ask your pharmacist or healthcare provider to explain it to you before you take the OTC medicine.   Managing nausea  Some people have an upset stomach (nausea) after surgery. This is often because of anesthesia, pain, or pain medicine, less movement of food in the stomach, or the stress of surgery. These tips will help you handle nausea and eat healthy foods as you get better. If you were on a special food plan before surgery, ask your healthcare provider if you should follow it while you get better. Check with your provider on how your eating should progress. It may depend on the surgery you had. These general tips may help:   Don't push yourself to eat. Your body will tell you when to eat and how much.  Start off with clear liquids and soup. They're easier to digest.  Next try semi-solid foods as you feel ready. These include mashed potatoes, applesauce, and gelatin.  Slowly move to solid foods. Don’t eat fatty, rich, or spicy foods at first.  Don't force yourself to have 3 large meals a day. Instead eat smaller amounts more often.  Take pain medicines with a small amount of solid food, such as crackers or toast. This helps prevent  nausea.  When to call your healthcare provider  Call your healthcare provider right away if you have any of these:   You still have too much pain, or the pain gets worse, after taking the medicine. The medicine may not be strong enough. Or there may be a complication from the surgery.  You feel too sleepy, dizzy, or groggy. The medicine may be too strong.  Side effects, such as nausea or vomiting. Your healthcare provider may advise taking other medicines to treat these or may change your treatment plan..  Skin changes, such as rash, itching, or hives. This may mean you have an allergic reaction. Your provider may advise taking other medicines.  The incision looks different (for instance, part of it opens up).  Bleeding or fluid leaking from the incision site, and you weren't told to expect that.  Fever of 100.4°F (38°C) or higher, or as directed by your healthcare provider.  Call 911  Call 911 right away if you have:   Trouble breathing  Facial swelling    If you have obstructive sleep apnea   You were given anesthesia medicine during surgery to keep you comfortable and free of pain. After surgery, you may have more apnea spells because of this medicine and other medicines you were given. The spells may last longer than normal.    At home:  Keep using the continuous positive airway pressure (CPAP) device when you sleep. Unless your healthcare provider tells you not to, use it when you sleep, day or night. CPAP is a common device used to treat obstructive sleep apnea.  Talk with your provider before taking any pain medicine, muscle relaxants, or sedatives. Your provider will tell you about the possible dangers of taking these medicines.  Contact your provider if your sleeping changes a lot even when taking medicines as directed.  [employer] last reviewed this educational content on 4/1/2024  This information is for informational purposes only. This is not intended to be a substitute for professional medical advice,  diagnosis, or treatment. Always seek the advice and follow the directions from your physician or other qualified health care provider.  © 0308-7379 [employer]. All rights reserved. This information is not intended as a substitute for professional medical care. Always follow your healthcare professional's instructions.

## 2025-06-12 NOTE — ANESTHESIA POSTPROCEDURE EVALUATION
Patient: Mary Reese Hatchett    Procedure Summary       Date: 06/12/25 Room / Location: [facility] MAIN OR 07 / [facility] MAIN OR    Anesthesia Start: 1242 Anesthesia Stop: 1531    Procedure: Right breast total mastectomy, right axillary sentinel lymph node biopsy with frozen section, right breast lymphoscintigraphy (Right: Breast) Diagnosis: (Right breast cancer)    Surgeons: Tu Magana MD Anesthesiologist: Donnie Christine DO    Anesthesia Type: general ASA Status: 3            Anesthesia Type: general    Vitals Value Taken Time   BP 93/58 06/12/25 15:29   Temp 97.2 °F (36.2 °C) 06/12/25 15:18   Pulse 67 06/12/25 15:30   Resp 16 06/12/25 15:30   SpO2 95 % 06/12/25 15:30   Vitals shown include unfiled device data.    [facility] AN Post Evaluation:   Patient Evaluated in PACU  Patient Participation: complete - patient participated  Level of Consciousness: awake and alert  Pain Score: 0  Pain Management: adequate  Airway Patency:patent  Yes    Nausea/Vomiting: none  Cardiovascular Status: acceptable  Respiratory Status: acceptable  Postoperative Hydration acceptable      Leeanna Anderson CRNA  6/12/2025 3:31 PM

## 2025-06-12 NOTE — ANESTHESIA PROCEDURE NOTES
Peripheral Block    Date/Time: 6/12/2025 12:52 PM    Performed by: Donnie Christine DO  Authorized by: Donnie Christine DO      General Information and Staff    Start Time:  6/12/2025 12:52 PM  End Time:  6/12/2025 12:55 PM  Anesthesiologist:  Donnie Christine DO  Performed by:  Anesthesiologist  Patient Location:  OR          Procedure Details    Patient Position:  Supine  Prep: ChloraPrep    Monitoring:  Heart rate, continuous pulse ox and blood pressure cuff  Block Type:  PEC2 and PEC1  Laterality:  Right  Injection Technique:  Single-shot    Needle    Needle Type:  Echogenic  Needle Gauge:  22 G  Needle Length:  90 mm  Needle Localization:  Ultrasound guidance  Reason for Ultrasound Use: no ultrasound evidence of intravascular and/or intraneural injection            Assessment    Injection Assessment:  Good spread noted, incremental injection, low pressure and negative aspiration for heme      Medications  6/12/2025 12:52 PM      Additional Comments

## 2025-06-12 NOTE — SPIRITUAL CARE NOTE
Spiritual Care Visit Note    Patient Name: Mary Reese Hatchett Date of Spiritual Care Visit: 23   : 1950 Primary Dx: <principal problem not specified>       Referred By: Referral From: Nurse    Spiritual Care Taxonomy:    Intended Effects: Demonstrate caring and concern    Methods: Collaborate with care team member, Offer support    Interventions: Active listening, Ask guided questions, Assist someone with Advance Directives, Discuss concerns, Silent prayer    Visit Type/Summary:     - Spiritual Care: Consulted with RN prior to visit. Offered empathic listening and emotional support. Patient and friend expressed appreciation for  visit.  - PoA: New PoAH Created: Visited patient in response to PoA for Healthcare consult/request. Created a new PoAH for Healthcare document that, per the patient, accurately reflects their wishes. Patient named friend, Adonay Aldana ([phone]) as primary agent and sister, Marielos De La Rosa ([phone]) as 1st successor and Anette Lewis ([phone]) as 2nd successor. Gave the patient the original PoAH document along with copies. Confirmed that the PoAH primary agent name and contact information have been entered into the Epic, Advance Directives section. A copy of the new PoAH document has been placed on the patient's paper chart.  remains available for follow up.     CARLITO Romero, CAMII, [facility]   [phone]     Spiritual Care support can be requested via an [facility] consult. For urgent/immediate needs, please contact the On Call  at: [employer]: ext [phone]

## 2025-06-12 NOTE — H&P (VIEW-ONLY)
Patient presents with:  Pre-Op: Preop-breast cancer    HPI:   Mary R Hatchett is a 74 year old female.   The patient presents to the office for new diagnosis right breast DCIS.     Previous breast procedures include none.     Previous mammogram was on 9/14/2022    The patient has an appointment with medical oncology Dr. Renu Mcdonald on 8/14/2024  none    Breast density: B    Bilateral breast mammogram 6/11/2024:    IMPRESSION AND RECOMMENDATION:    1. Calcifications in the right breast require additional evaluation with diagnostic mammogram.     2. No specific mammographic evidence of malignancy in the left breast.    ANY FURTHER EVALUATION SHOULD BE BASED ON CLINICAL ASSESSMENT.    The Department of Radiology has informed the patient of their mammogram results by letter or by My  Chart and will contact the patient for needed follow up studies.    ---------------------------------------------------------------------------------------  ----------------------------------------    BI-RADS Final Assessment Category 0: Incomplete-Need Additional Imaging Evaluation  Management Recommendation: Recall for additional imaging.      Right breast mammogram 7/1/2024:  FINDINGS:     Additional images of the right breast were obtained. There is persistence of a 2 to 3 mm focal  grouping of indeterminate mild calcifications in the lateral, superior, far posterior aspect right  breast. There is no associated mass or distortion.  .  ----------------------------------------  IMPRESSION AND RECOMMENDATION    A focal developing grouping of microcalcifications in the right breast for which stereotactic biopsy  is recommended. Findings were discussed with patient.    ----------------------------------------  BI-RADS Category 4: Suspicious.  Recommendation: Biopsy should be performed in the absence of clinical contraindication.      Stereotactic right breast core biopsy 7/3/2024:  Impression   IMPRESSION:    *Successful stereotactic guided  biopsy of microcalcifications at the right breast 10:00 position. A  clip was placed. A vacuum-assisted device was utilized. Results will follow.    This was interpreted by Pranav Graham M.D.     Addendum    DATE OF SERVICE: 07.03.2024  Addendum:    Findings of the right breast stereotactic biopsy 10:00 position for microcalcifications demonstrates  DCIS and ADH. The findings are concordant. Findings were discussed with patient at 8:30 AM. This can  be localized under stereotactic guidance.    BI-RADS Category 6: Known Biopsy-Proven Malignancy  Recommendation: Surgical management as clinically appropriate.      This was interpreted by Pranav Graham M.D.     Final Diagnosis   Right breast, calcifications, 10:00, posterior, stereotactic needle core biopsy:  -Ductal carcinoma in situ (DCIS).  -Nuclear grade 1, cribriform type with dystrophic calcifications and necrosis.  -Largest focus of DCIS measures 5 mm (0.5 cm); 5% of submitted tissue.  -Atypical ductal hyperplasia (ADH).       Addendum 1   Quantitative Immunohistochemistry:   Material: Block A3  Population: Tumor Cells    Antibody & Clone Result Interpretation   Estrogen Receptor - SP1 >95 % Positive Cells Strong Positive   Progesterone Receptor - 16 2 % Positive Cells Strong Positive       8/7/2020 plan: Patient resents for follow-up evaluation for preop discussion for right breast DCIS.  Laboratory data chest x-ray on 7/11/2024: CBC normal, CMP normal GFR 35  Laboratory data 7/12/2024: BMP GFR 47.25    BRCA testing:pending    Breast Bra size:42 C    The patient did not see medical oncology or radiation oncology  The patient is scheduled to see pulmonology next week     PA lateral chest x-ray on 7/11/2024:  mpression  IMPRESSION:  Please see above regarding the right lung base density laterally.     CT scan of chest on 7/18/2024:  Impression   IMPRESSION:  * Small right pleural effusion with compressive atelectasis in the right lower lobe, accounting  for  radiograph described abnormality.    * Enlarged right hilar and mediastinal lymph nodes. While findings may reflect reactive lymphoid  hyperplasia, metastasis cannot be excluded. 3 months contrast enhanced CT chest follow-up is  recommended.    * Stable pulmonary micronodules.     MRI of the breast 7/12/2024:  mpression   IMPRESSION:    1. Biopsy-proven ductal carcinoma in situ right breast posterior one third aspect junction of the  upper outer, lower outer quadrant.    2. Right breast posterior third aspect approximately 6:00 position linear nonmasslike enhancement.  This is indeterminate and in the setting of breast conservation requires further evaluation with MRI  guided core needle biopsy.  Within the right breast posterior one third aspect 6:00 position approximately 3.6 cm anterior   medial to the biopsy-proven site of DCIS there is 1.8 cm linear nonmasslike enhancement demonstrated   on axial series slice 51, sagittally slice 234. This demonstrates rapid initial uptake with   persistent enhancement kinetics.     3. No MR evidence of malignancy in the left breast.    4. Asymmetric right-sided pleural thickening and associated enhancement. Conjunction with nodular  density demonstrated on prior chest radiograph, recommend further evaluation with chest CT scan.    BI-RADS Final Assessment Category 4: Suspicious.  Management Recommendation: Biopsy Should Be Performed in the Absence of Clinical Contraindication.     Right breast MRI guided core biopsy 7/22/2024:  Impression   IMPRESSION:   1. Successful right breast MRI guided, vacuum assisted core needle biopsy.  2. Successful marker clip placement with confirmation by right digital diagnostic mammogram.  3. See addendum for radiology pathology correlation.     Addendum    DATE OF SERVICE: 07.22.2024  PATHOLOGY ADDENDUM * PATHOLOGY ADDENDUM     Addendum, 7/25/2024 9:12 AM    Final pathology of DCIS nuclear grade 2, ER-positive SD negative is concordant  with imaging  findings. Surgical consult is recommended. Patient is under the care of Dr. Magana and has  follow-up with him on August 7,2024.    Above was discussed with the patient by Dr. Olivares on 7/25/2024.    PATHOLOGY ADDENDUM ENDS     Final Diagnosis   Right breast, lower central, MRI-guided core needle biopsy:  - Ductal carcinoma in situ (DCIS).   - Nuclear grade 2, cribriform type, with comedonecrosis and associated microcalcifications.   - Largest focus of DCIS measures 21 mm (2.1 cm); 10% of submitted tissue.        Addendum 1   Quantitative Immunohistochemistry:   Material: Block A1  Population: DCIS    Antibody & Clone Result Interpretation   Estrogen Receptor - SP1 >95 % Positive Cells strong Positive   Progesterone Receptor - 16 0 % Positive Cells Negative       9/17/2024: Patient presents for follow-up evaluation status post right breast lumpectomy with wire localization for DCIS with 2 separate sites on 9/9/2024.  Patient with complaints of none    Pathology reveals 1 cm a large focus of DCIS 8 cm in size close margins at the anterior skin superior margin less than 1 mm, inferior margin less than 1 mm  The patient saw Dr. Mcdonald preoperatively  Patient scheduled to see radiation oncology Dr. Canseco next Wednesday.  Patient is scheduled to see Dr. Mcdonald in 2 weeks.    Final Diagnosis   A- Right breast, lumpectomy:  -Ductal carcinoma in situ (DCIS) measuring 80 mm (8 cm) in the medial to lateral dimension.   -Nuclear grades 1 and 2, solid, cribriform and micropapillary type.   -Associated necrosis and microcalcifications.   -Atypical ductal hyperplasia (ADH).   -DCIS is less than 1 mm from the anterior margin.   -See specimens B-F for all remaining final margins.   -Biopsy site changes and clips are present.   -Background breast tissue with microcysts and columnar cell change.     B- Right breast, lateral margin, excision:  -Ductal carcinoma in situ (DCIS), multiple foci measuring up to 4 mm in  greatest extent.   -DCIS is less than 1 mm from the lateral margin.     C- Right breast, superior margin, excision:  -Ductal carcinoma in situ (DCIS) present at less than 1 mm from the superior margin.   -Deeper sections examined.     D- Right breast, inferior margin, excision:  -Ductal carcinoma in situ (DCIS), multiple foci measuring up to 5 mm in greatest extent.   -DCIS is less than 1 mm from the inferior margin.     E- Right breast, medial margin, excision:  -Fibrovascular and adipose tissue.   -New margin, negative for in situ carcinoma.     F- Right breast, deep margin, excision:  -Fibrovascular and adipose tissue.   -New margin, negative for in situ carcinoma.         [facility] OPERATIVE REPORT:     PATIENT NAME: Mary R Hatchett  : 1950   MRN: [id number]  SITE: [facility]      DATE OF OPERATION: 2024    PREOPERATIVE DIAGNOSIS: Right breast ductal carcinoma-in-situ (DCIS) multifocal multicentric with 2 separate sites  preoperative clinical stage 0      POSTOPERATIVE DIAGNOSIS: Same right  breast ductal carcinoma-in-situ (DCIS) multifocal multicentric 2 separate sites  postoperative clinical stage 0    PROCEDURE PERFORMED: Right breast lumpectomy with intraoperative lymphatic mapping and sentinel lymph node dissection, with intraoperative specimen mammogram 2 separate sites separate localizations    SURGEON: Tu Magana MD    ASST: DILLON Alanis (Assistant helped position patient and helped with positioning, retraction, suturing, closure, dressings etc.)    ANESTHESIA: General anesthesia     ESTIMATED BLOOD LOSS: 10 ml         10/9/2024: Patient presents for follow-up evaluation status post right breast lumpectomy with wire localization for DCIS with 2 separate sites on 2024.  Patient with complaints of none   Pathology reveals 1 cm a large focus of DCIS 8 cm in size close margins at the anterior skin superior margin less than 1 mm, inferior margin less than 1 mm and  lateral margin less than 1 mm  The patient saw Dr. Mcdonald preoperatively  Patient saw radiation oncology Dr. Canseco who recommended reexcision.   Patient saw Dr. Mcdonald who recommended reexcision.  She says her priority is reexcision and then will think over her options regarding both radiation and adjuvant endocrine therapy     10/25/2024:Patient presents for follow-up evaluation status post right breast lumpectomy with wire localization for DCIS with 2 separate sites on 2024 and reexcision on 10/15/2024:  Patient with complaints of none     Pathology   Final Diagnosis   Right breast, re-excision lumpectomy:  -Ductal carcinoma in situ (DCIS) measuring up to 7 mm (0.7 cm) on a single slide.  -Nuclear grades 1 and 2, cribriform and solid types.  -Associated necrosis and microcalcifications.  -DCIS is present on 4 of 15 slides examined.  -DCIS is present < 1 mm (<0.1 cm) from the medial and superior margins and 1 mm (0.1 cm) from the posterior margin. (See comment)  -One intramammary lymph node, negative for carcinoma (0/1).  -Background breast tissue with atypical ductal hyperplasia (ADH) and intraductal papilloma.  -Unremarkable skin.       The patient saw Dr. Mcdonald preoperatively  Patient saw radiation oncology Dr. Canseco who recommended reexcision.   Patient saw Dr. Mcdonald    She says her priority is reexcision and then will think over her options regarding both radiation and adjuvant endocrine therapy     [facility]PER[facility]IVE REPORT:     PATIENT NAME: Mary R Hatchett  : 1950   MRN: [id number]  SITE: [facility]      DATE OF OPERATION: 10/15/2024    PREOPERATIVE DIAGNOSIS: Right breast ductal carcinoma-in-situ (DCIS) close margin  preoperative clinical stage 0      POSTOPERATIVE DIAGNOSIS: Same Right  breast ductal carcinoma-in-situ (DCIS) close margins  postoperative clinical stage 0    PROCEDURE PERFORMED: Right breast reexcision lumpectomy    SURGEON: Tu Magana MD    ASST: Jazmine Coon  CSA(Assistant helped position patient and helped with positioning, retraction, suturing, closure, dressings etc.)    ANESTHESIA: General anesthesia     ESTIMATED BLOOD LOSS: 5 ml      5/9/2025: Patient presents for follow-up evaluation status post right breast lumpectomy with wire localization for DCIS with 2 separate sites on 9/9/2024 and reexcision on 10/15/2024:  Patient recently underwent a right breast ultrasound-guided core biopsy for a new right breast mass on 5/2/2025.  Pathology reveals recurrent DCIS of the right breast. Second biopsy reveals atypical glandular proliferation atypical ductal hyperplasia.  Patient with complaints of none     Patient had close reexcision of margins with DCIS  Patient0 right breast cancer.  Patient did undergo radiation therapy. Completed 1/23/2025 at [facility]  Patient is on antiestrogen therapy with Dr. Renu Mcdonald     Bilateral breast mammogram 4/23/2025:  IMPRESSION AND RECOMMENDATION:  Indeterminate right breast microcalcifications for which a 2 site representative stereotactic core  needle biopsy is recommended for further assessment. Given location within the inferior breast,  recommend biopsy on the prone biopsy unit. Recommend biopsy of the most anterior and posterior  aspect of the indeterminate microcalcifications. These findings were discussed with the patient by  Dr. Paz. The patient will be assisted in scheduling the biopsy and the referring physician will  be informed of the results and recommendations.    BI-RADS CATEGORY 4: SUSPICIOUS ABNORMALITY--BIOPSY SHOULD BE CONSIDERED:    The Department of Radiology has informed the patient of their results by letter and will contact the  patient for needed follow-up studies.      Right breast ultrasound-guided core biopsy x 2 on 5/4/2025:  Impression    Impression   IMPRESSION:   1. Successful right breast stereotactic, vacuum assisted core needle biopsy of calcifications in   the central outer breast at posterior  depth with placement of a stoplight shaped marker clip.   2. Successful right breast stereotactic, vacuum assisted core needle biopsy of calcifications in   the central outer breast at middle depth with placement of a tophat shaped marker clip.   3. Successful marker clip placements with confirmation by right digital diagnostic mammogram.   4. See addendum for radiology pathology correlation.     BI-RADS Final Assessment Category: Post Procedure Mammograms for Marker Placement.   Management Recommendation: Assess radiologic/pathologic concordance.   [facility], [place], [address]     Pathology:  inal Diagnosis   A. Right breast, central outer, posterior, with calcifications, stoplight clip, stereotactic needle core biopsy:  -Ductal carcinoma in situ (DCIS).   -Nuclear grade 1-2, cribriform type, with comedonecrosis and associated microcalcifications.   -Atypical ductal hyperplasia.  -Benign breast tissue with associated microcalcifications.    B. Right breast, central outer, mid depth, with calcifications,tophat clip, stereotactic needle core biopsy:  -Atypical glandular proliferation and atypical intraductal proliferation, please see comment.  -Foreign body giant cell reaction with associated microcalcifications.         Electronically signed by Anita Rodas on 5/9/2025 at 10:30 AM     Ancillary Studies [facility]   Quantitative Immunohistochemistry:   Material: Block A1  Population: DCIS    Antibody & Clone Result Interpretation   Estrogen Receptor - SP1 >95 % Positive Cells strong Positive   Progesterone Receptor - 16 0 % Positive Cells Negative       5/23/2025: Patient presents for preop discussion of recurrent right breast cancer.    The patient did see Dr. Mcdonald on 5/15/2025:  Who has recommended mastectomy   Patient with complaints of none    The patient is decided on right-sided mastectomy. Patient is contemplating reconstruction.    Bilateral breast MRI on  2025:  IMPRESSION      1. Post treatment changes of the right breast. 2 biopsy clip artifacts are seen from recent 2 site  stereotactic biopsy. There is no significant enhancement seen around both of these clips. Surgical  consult continues to be recommended.  2. No other abnormal areas of enhancement seen bilaterally.  3. No significant adenopathy seen.    BI-RADS Category 6: Known Biopsy-Proven Malignancy  Recommendation: Surgical management as clinically appropriate.        Gyne History:  Menarche at age 10.   Age at birth of 1st child: none.   Number of pregnancies: .   Patient has history of breast feeding na.   Menopause at age 53.   Hormone replacement history: none.   Oral contraceptive pills taken for 12.   Family history of breast or ovarian cancer: none.    Allergies:    Diltiazem OTHER (SEE COMMENTS)  Comment:Gum swelling  Gadolinium Derivati* ITCHING, SWELLING  Comment:Hands swollen, abdomin itching CONTRAST DYE  Hydrochlorothiazide* RASH  Lisinopril UNKNOWN  Comment:Pt does not remember   Current Meds:  Current Outpatient Medications   Medication Sig Dispense Refill   cloNIDine 0.3 MG Oral Tab Take 1 tablet (0.3 mg total) by mouth 3 (three) times daily. 270 tablet 3   metoprolol tartrate 100 MG Oral Tab Take 1 tablet (100 mg total) by mouth 2 (two) times daily. 180 tablet 3   simvastatin 40 MG Oral Tab Take 1 tablet (40 mg total) by mouth every evening. 90 tablet 3   hydrALAZINE 100 MG Oral Tab Take 1 tablet (100 mg total) by mouth 3 (three) times daily. 270 tablet 3   Cholecalciferol (VITAMIN D3) 1000 units Oral Cap Take by mouth.   Multiple Vitamins-Minerals (MULTI-VITAMIN/MINERALS) Oral Tab Take 1 tablet by mouth daily.   predniSONE 10 MG Oral Tab Take 1 tablet (10 mg total) by mouth As Directed. Take 5 tabs 13 hours prior to exam Take 5 tabs 7 hours prior to exam Take 5 tabs 1 hour prior to exam (Patient not taking: Reported on 2025) 15 tablet 0   diphenhydrAMINE HCl 50 MG Oral Tab  Take 1 tablet (50 mg total) by mouth As Directed. Take 1 tablet by mouth 1 hour prior to exam (Patient not taking: Reported on 5/23/2025) 1 tablet 0   anastrozole 1 MG Oral Tab tab Take 1 tablet (1 mg total) by mouth daily. (Patient not taking: Reported on 5/23/2025) 90 tablet 1       HISTORY:  Past Medical History:   Diagnosis Date   Acute renal failure (ARF) ([facility])   Breast cancer (HCC)   Cancer (HCC)   breast   CKD (chronic kidney disease) stage 3, GFR 30-59 ml/min ([facility]) 02/16/2016   Diabetes ([facility])   DIET CONTROLLED   Disorder of kidney and ureter   CKD3   DJD (degenerative joint disease) 02/16/2016   Edema 08/30/2016   Essential hypertension 02/16/2016   H/O parathyroidectomy 02/16/2016   High blood pressure   High cholesterol   High potassium   History of anemia 02/16/2016   Hyperlipidemia   LVH (left ventricular hypertrophy) due to hypertensive disease 02/23/2016   Mitral regurgitation 02/23/2016   Osteoarthritis   Peripheral polyneuropathy 04/28/2022   Primary osteoarthritis of both hips 06/02/2017   Pure hypercholesterolemia 02/16/2016   Tricuspid regurgitation 02/23/2016   Type 2 diabetes mellitus with diabetic chronic kidney disease (HCC) 02/16/2016   Vitamin D deficiency, unspecified 03/15/2016     Past Surgical History:   Procedure Laterality Date   COLONOSCOPY 04/2010   COLONOSCOPY STOMA DX INCLUDING COLLJ SPEC SPX   HIP REPLACEMENT SURGERY Bilateral   LUMPECTOMY RIGHT 09/09/2024   re excision on 10/15/24 dcis,, adh,   NEEDLE BIOPSY RIGHT   PARATHYROIDECTOMY 08/2006   RADIATION RIGHT   ROOT CANAL/NON-SURGICAL ACCESS     Family History   Problem Relation Age of Onset   Heart Disease Father   Hypertension Mother   Stroke Mother   Stroke Sister   No Known Problems Brother   No Known Problems Brother   No Known Problems Brother   Alcohol and Other Disorders Associated Brother   Other (HIV) Brother   No Known Problems Brother   Hypertension Sister   Other (history unknown) Sister     Social History  Tobacco  Use  Smoking status: Never  Smokeless tobacco: Never  Vaping Use  Vaping status: Never Used  Alcohol use: Not Currently  Comment: 1 drink/week  Drug use: No      ROS:   GENERAL HEALTH: otherwise feels well; no weight loss  SKIN: denies any unusual skin lesions or rashes  EYES: no visual complaints or deficits  HEENT: denies nasal congestion, sinus pain or sore throat; hearing loss negative  RESPIRATORY: denies shortness of breath, wheezing or cough   CARDIOVASCULAR: denies chest pain or JJ; no palpitations   GI: denies nausea, vomiting, constipation, diarrhea; no rectal bleeding; no heartburn  MUSCULOSKELETAL: no joint complaints upper or lower extremities, no back or rib pain  NEURO: no sensory or motor complaint  PSYCHE: no symptoms of depression or anxiety  HEMATOLOGY: denies hx anemia; denies bruising or excessive bleeding  ENDOCRINE: denies excessive thirst or urination; denies unexpected wt gain or wt loss  ALLERGY/IMM.: denies food or seasonal allergies    PHYSICAL EXAM:   General Examination:   NECK/THYROID: neck supple, full range of motion, no cervical lymphadenopathy.   LYMPH NODES: no axillary, supraclavicular adenopathy.   HEART: no murmurs, regular rate and rhythm,   LUNGS: clear to auscultation bilaterally.   ABDOMEN: normal, bowel sounds present, soft, nontender, nondistended, no ascites, no hepatosplenomegaly.   EXTREMITIES: No clubbing cyanosis or edema  BREAST EXAM:  Axillary Lymph Nodes: No supraclavicular, infraclavicular or axillary adenopathy present  Breast: LEFT BREAST: No palpable discrete mass present.   RIGHT BREAST: biopsy site high upper outer 11:00  No hematoma or ecchymosis .  No palp discrete mass present    Nipple: DISCHARGE: None. RETRACTION: None. SKIN CHANGES: None.   8/7/2024: Right breast no palpable discrete mass present. Biopsy wounds healed. No hematoma or ecchymosis present.  9/17/2024: Right breast lumpectomy wound healing well. Steri-Strips in place. No evidence of  infection. No hematoma.    10/9/2024: Right breast lumpectomy wound healing well. Steri-Strips in place. No evidence of infection. No hematoma.  10/25/2024: Right breast lumpectomy wound healing well. Steri-Strips in place. No hematoma. No seroma. No evidence of infection.  5/9/2025: Right breast lumpectomy changes and radiation changes present. Biopsy site at the 7:00 location with Steri-Strips in place mild ecchymosis. No hematoma.0  5/23/2025: Right breast lumpectomy and radiation changes present. There is firmness on the outside of the lateral right breast at the previous lumpectomy site. There is mild radiation changes involving the skin. No other palp discrete mass present. There is accessory nipple at the 6:00 location of the inframammary fold.    ASSESSMENT/ PLAN:   Mary R Hatchett is a 74 year old female with a diagnosis of right breast DCIS x 2.  Patient status post right breast lumpectomy wire localization x 2 on 9/9/2024.  Pathology reveals a very large single focus of 8 cm of DCIS. No invasive cancer present. Margins are close less than 1 mm inferior and superior margins.  The patient did undergo radiation therapy and completed this at [facility] on 1/23/2025.  Patient is on Arimidex under the direction of Dr. Mcdonald.  Patient had recent new right breast microcalcifications spanning 3.8 cm. Ultrasound-guided core biopsy x 2 reveals recurrent DCIS as well as atypical ductal hyperplasia.  I had extensive discussion with the patient as well as her friend as etiology of recurrent breast cancer. I discussed that the patient prior radiation therapy cannot have any further radiation therapy. Patient will require mastectomy.  I have reviewed the most recent bilateral breast MRI which reveals 2 foci of DCIS and ADH present with no other enhancement around the surrounding areas.  Given recurrent breast cancer patient is agreeing to right-sided mastectomy.  Patient's friend and the patient are discussing  possible reconstructive options.  Given the prior radiation immediate reconstruction may not be an option however I would recommend the patient have evaluation by plastic surgery Dr. Williamson to further evaluate if reconstruction immediately or would be delayed. Once the patient has had evaluation Dr. Williamson she will let us know her final decision about reconstructive options.  I did discuss that since the patient is having a mastectomy I will discuss with Dr. Mcdonald about the need for sentinel lymph node biopsy. DCIS does not metastasized to the lymph nodes however if the patient is having a mastectomy typically the patient would have a sentinel lymph node biopsy at the time of the mastectomy. Patient did have 1 intramammary lymph node removed with her previous reexcision of the lumpectomy.  If decision by Dr. Mcdonald for not to have sentinel lymph node biopsy performed would proceed with total breast mastectomy.  I discussed the pathology results with the patient. had a detailed discussion with Pt and family's concerning the biopsy results, and breast cancer in detail. I explained the radiology results of breast imaging. I explained breast cancer invasive vs DCIS (noninvasive breast cancer) and treatment options. I explained possible multifocal disease, multiple cancers and DCIS with significant increased risk in certain cases of local recurrence or second cancer. I discussed options of treatment, we discussed option of mastectomy, bilateral mastectomy, lumpectomy with usual radiation. I discussed possible outcomes of mastectomy and lumpectomy of deformity and possible reconstruction options. We discussed possible sentinel lymph node biopsy and risks including not 100% accurate, i.e. false negatives. Discussed option and controversy concerning following at same surgery with standard node dissection is sentinel lymph nodes positive. However, Pt understand significant risk of lymphedema, swelling, pain, numbness,  deformity of chest wall, increased risk of infection Attention will be made during the operation to obtain adequate surgical margins. The patient understands the indications, details, and potential risks and complications including bleeding, infection, seroma, 8% risk of local recurrence, 3 to 5% chance of lymphedema, positive margins requiring re excision, cosmetic changes involving the lumpectomy site, need for possible completion axillary lymph node dissection if axillary lymph node is involved by cancer, as well as the risks of the procedure including but not limited to bleeding, infection, non healing wound, paraesthesia at the excision site, missed diagnosis, delayed diagnosis, inability to diagnosis, need for further surgery if malignancy is present at the surgical margin or involving the lymph node; as well as the risks with anesthesia including myocardial infarction, respiratory failure, renal failure, stroke, pulmonary embolism, deep vein thrombosis, and even death were discussed in detail with the patient who understands, consents, and wishes to proceed with surgery. We will plan right breast mastectomy with possible right axillary sentinel lymph node biopsy with frozen section possible right axillary lymph node dissection under general anesthetic at [facility] on pending decision about reconstructive options.  I spent 40 minutes on this patient visit. This included: preparing to see patient, obtaining history, reviewing separately obtained history, performing physical examination, independently interpreting results and discussing with patient, patient and family counseling, referring and communicating with other healthcare professionals, and care coordination

## 2025-06-12 NOTE — BRIEF OP NOTE
Pre-Operative Diagnosis: Right breast cancer     Post-Operative Diagnosis: the same     Procedure Performed:   Right breast total mastectomy, right axillary sentinel lymph node biopsy with frozen section, right breast lymphoscintigraphy    Surgeons and Role:     * Tu Magana MD - Primary    Assistant(s):  PA: Jacobo Valiente PA      Surgical Findings: recurrent right breast ca     Specimen: right breast right axillary sentinel lymph node biopsy     Estimated Blood Loss: 25 mL  Dictation Number:      Tu Magana MD  6/12/2025  2:53 PM

## 2025-07-01 ENCOUNTER — TELEPHONE (OUTPATIENT)
Dept: PHYSICAL THERAPY | Facility: HOSPITAL | Age: 75
End: 2025-07-01

## 2025-07-17 ENCOUNTER — HOSPITAL ENCOUNTER (OUTPATIENT)
Facility: HOSPITAL | Age: 75
Setting detail: HOSPITAL OUTPATIENT SURGERY
Discharge: HOME | End: 2025-07-17
Attending: SURGERY | Admitting: SURGERY
Payer: MEDICARE

## 2025-07-17 ENCOUNTER — ANESTHESIA (OUTPATIENT)
Dept: SURGERY | Facility: HOSPITAL | Age: 75
End: 2025-07-17
Payer: MEDICARE

## 2025-07-17 ENCOUNTER — ANESTHESIA EVENT (OUTPATIENT)
Dept: SURGERY | Facility: HOSPITAL | Age: 75
End: 2025-07-17
Payer: MEDICARE

## 2025-07-17 VITALS
BODY MASS INDEX: 27.92 KG/M2 | RESPIRATION RATE: 13 BRPM | DIASTOLIC BLOOD PRESSURE: 74 MMHG | OXYGEN SATURATION: 98 % | HEIGHT: 67.5 IN | HEART RATE: 67 BPM | WEIGHT: 180 LBS | SYSTOLIC BLOOD PRESSURE: 127 MMHG | TEMPERATURE: 98 F

## 2025-07-17 LAB
GLUCOSE BLDC GLUCOMTR-MCNC: 118 MG/DL (ref 70–99)
GLUCOSE BLDC GLUCOMTR-MCNC: 139 MG/DL (ref 70–99)

## 2025-07-17 PROCEDURE — 87070 CULTURE OTHR SPECIMN AEROBIC: CPT | Performed by: SURGERY

## 2025-07-17 PROCEDURE — 87075 CULTR BACTERIA EXCEPT BLOOD: CPT | Performed by: SURGERY

## 2025-07-17 PROCEDURE — 87176 TISSUE HOMOGENIZATION CULTR: CPT | Performed by: SURGERY

## 2025-07-17 PROCEDURE — 87205 SMEAR GRAM STAIN: CPT | Performed by: SURGERY

## 2025-07-17 PROCEDURE — 82962 GLUCOSE BLOOD TEST: CPT

## 2025-07-17 RX ORDER — SODIUM CHLORIDE, SODIUM LACTATE, POTASSIUM CHLORIDE, CALCIUM CHLORIDE 600; 310; 30; 20 MG/100ML; MG/100ML; MG/100ML; MG/100ML
INJECTION, SOLUTION INTRAVENOUS CONTINUOUS
Status: DISCONTINUED | OUTPATIENT
Start: 2025-07-17 | End: 2025-07-17

## 2025-07-17 RX ORDER — METOCLOPRAMIDE HYDROCHLORIDE 5 MG/ML
10 INJECTION INTRAMUSCULAR; INTRAVENOUS EVERY 8 HOURS PRN
Status: CANCELLED | OUTPATIENT
Start: 2025-07-17

## 2025-07-17 RX ORDER — NICOTINE POLACRILEX 4 MG
30 LOZENGE BUCCAL
Status: DISCONTINUED | OUTPATIENT
Start: 2025-07-17 | End: 2025-07-17

## 2025-07-17 RX ORDER — HYDROMORPHONE HYDROCHLORIDE 1 MG/ML
0.4 INJECTION, SOLUTION INTRAMUSCULAR; INTRAVENOUS; SUBCUTANEOUS EVERY 5 MIN PRN
Status: DISCONTINUED | OUTPATIENT
Start: 2025-07-17 | End: 2025-07-17

## 2025-07-17 RX ORDER — ONDANSETRON 2 MG/ML
4 INJECTION INTRAMUSCULAR; INTRAVENOUS EVERY 6 HOURS PRN
Status: CANCELLED | OUTPATIENT
Start: 2025-07-17

## 2025-07-17 RX ORDER — ONDANSETRON 2 MG/ML
4 INJECTION INTRAMUSCULAR; INTRAVENOUS EVERY 6 HOURS PRN
Status: DISCONTINUED | OUTPATIENT
Start: 2025-07-17 | End: 2025-07-17

## 2025-07-17 RX ORDER — LIDOCAINE HYDROCHLORIDE 10 MG/ML
INJECTION, SOLUTION EPIDURAL; INFILTRATION; INTRACAUDAL; PERINEURAL AS NEEDED
Status: DISCONTINUED | OUTPATIENT
Start: 2025-07-17 | End: 2025-07-17 | Stop reason: SURG

## 2025-07-17 RX ORDER — NALOXONE HYDROCHLORIDE 0.4 MG/ML
0.08 INJECTION, SOLUTION INTRAMUSCULAR; INTRAVENOUS; SUBCUTANEOUS AS NEEDED
Status: DISCONTINUED | OUTPATIENT
Start: 2025-07-17 | End: 2025-07-17

## 2025-07-17 RX ORDER — DEXTROSE MONOHYDRATE 25 G/50ML
50 INJECTION, SOLUTION INTRAVENOUS
Status: DISCONTINUED | OUTPATIENT
Start: 2025-07-17 | End: 2025-07-17

## 2025-07-17 RX ORDER — ACETAMINOPHEN 500 MG
1000 TABLET ORAL ONCE
Status: COMPLETED | OUTPATIENT
Start: 2025-07-17 | End: 2025-07-17

## 2025-07-17 RX ORDER — INSULIN ASPART 100 [IU]/ML
INJECTION, SOLUTION INTRAVENOUS; SUBCUTANEOUS ONCE
Status: DISCONTINUED | OUTPATIENT
Start: 2025-07-17 | End: 2025-07-17

## 2025-07-17 RX ORDER — GLYCOPYRROLATE 0.2 MG/ML
INJECTION, SOLUTION INTRAMUSCULAR; INTRAVENOUS AS NEEDED
Status: DISCONTINUED | OUTPATIENT
Start: 2025-07-17 | End: 2025-07-17 | Stop reason: SURG

## 2025-07-17 RX ORDER — METOPROLOL TARTRATE 25 MG/1
25 TABLET, FILM COATED ORAL ONCE AS NEEDED
Status: DISCONTINUED | OUTPATIENT
Start: 2025-07-17 | End: 2025-07-17 | Stop reason: HOSPADM

## 2025-07-17 RX ORDER — NICOTINE POLACRILEX 4 MG
15 LOZENGE BUCCAL
Status: DISCONTINUED | OUTPATIENT
Start: 2025-07-17 | End: 2025-07-17

## 2025-07-17 RX ORDER — CEFADROXIL 500 MG/1
500 CAPSULE ORAL 2 TIMES DAILY
Qty: 42 CAPSULE | Refills: 0 | Status: SHIPPED | OUTPATIENT
Start: 2025-07-17 | End: 2025-08-07

## 2025-07-17 RX ORDER — BUPIVACAINE HCL/EPINEPHRINE 0.25-.0005
VIAL (ML) INJECTION AS NEEDED
Status: DISCONTINUED | OUTPATIENT
Start: 2025-07-17 | End: 2025-07-17 | Stop reason: HOSPADM

## 2025-07-17 RX ORDER — HYDROMORPHONE HYDROCHLORIDE 1 MG/ML
0.2 INJECTION, SOLUTION INTRAMUSCULAR; INTRAVENOUS; SUBCUTANEOUS EVERY 5 MIN PRN
Status: DISCONTINUED | OUTPATIENT
Start: 2025-07-17 | End: 2025-07-17

## 2025-07-17 RX ORDER — ONDANSETRON 2 MG/ML
INJECTION INTRAMUSCULAR; INTRAVENOUS AS NEEDED
Status: DISCONTINUED | OUTPATIENT
Start: 2025-07-17 | End: 2025-07-17 | Stop reason: SURG

## 2025-07-17 RX ADMIN — LIDOCAINE HYDROCHLORIDE 50 MG: 10 INJECTION, SOLUTION EPIDURAL; INFILTRATION; INTRACAUDAL; PERINEURAL at 09:26:00

## 2025-07-17 RX ADMIN — SODIUM CHLORIDE, SODIUM LACTATE, POTASSIUM CHLORIDE, CALCIUM CHLORIDE: 600; 310; 30; 20 INJECTION, SOLUTION INTRAVENOUS at 09:44:00

## 2025-07-17 RX ADMIN — SODIUM CHLORIDE, SODIUM LACTATE, POTASSIUM CHLORIDE, CALCIUM CHLORIDE: 600; 310; 30; 20 INJECTION, SOLUTION INTRAVENOUS at 09:56:00

## 2025-07-17 RX ADMIN — GLYCOPYRROLATE 0.2 MG: 0.2 INJECTION, SOLUTION INTRAMUSCULAR; INTRAVENOUS at 09:23:00

## 2025-07-17 RX ADMIN — ONDANSETRON 4 MG: 2 INJECTION INTRAMUSCULAR; INTRAVENOUS at 09:31:00

## 2025-07-17 NOTE — ANESTHESIA PROCEDURE NOTES
Airway  Date/Time: 7/17/2025 9:28 AM  Reason: elective      General Information and Staff   Patient location during procedure: OR  Anesthesiologist: Joaquín Higuera MD  Performed: anesthesiologist   Performed by: Joaquín Higuera MD  Authorized by: Joaquín Higuera MD        Indications and Patient Condition  Indications for airway management: anesthesia  Sedation level: deep      Preoxygenated: yesPatient position: sniffing    Mask difficulty assessment: 1 - vent by mask    Final Airway Details    Final airway type: supraglottic airway      Successful airway: classic  Size: 4     Number of attempts at approach: 1    Additional Comments  Atraumatic x 1

## 2025-07-17 NOTE — OPERATIVE REPORT
[facility] OPERATING ROOM OPERATIVE REPORT:     PATIENT NAME: Mary Reese Hatchett  : 1950   MRN: [id number]  SITE: [facility]      DATE OF OPERATION:   2025    PREOPERATIVE DIAGNOSIS: Right breast cancer with right chest wall seroma         POSTOPERATIVE DIAGNOSIS: Same with right chest wall seroma complex        PROCEDURE PERFORMED: incision and drainage of right chest wall complex seroma           SURGEON:  Tu Magana MD    ASST:  DILLON Alanis (Assistant helped position patient and helped with positioning, retraction, suturing, closure, dressings etc.)    ANESTHESIA: General anesthesia     ESTIMATED BLOOD LOSS:   1 ml    COMPLICATIONS: none     INDICATIONS:   This is a 74 year old female  with a history of right  breast   cancer.  Patient status post right breast mastectomy.  Patient had radiation therapy previously.  Patient developed recurrent chest wall seroma at the mastectomy site.  The patient as well as her friend's etiology above.  Discussed the need for surgical drainage with sterile technique drain placements.  I discussed increased risk for infection due to previous radiation as well as recurrent chest wall seroma formation.    The risks of procedure including bleeding, infection, postoperative hematoma, recurrent, formation, wound infection, nonhealing wound, scar formation, cosmetic changes involving the incision site, need for further surgery  seroma formation, as well as risks with anesthesia including myocardial infarction, respiratory failure, renal failure, pulmonary embolus, DVT and even death were all discussed in detail with the patient as well as their friend who both understand, consent and wish to proceed with the operation.    OPERATION:  The patient was brought to the operating room and placed on the operating room table in the supine position.   The right chest wall and axilla was prepped and draped in routine sterile fashion.  A small incision  made in the medial aspect of the right chest with a 10 blade.  Dissection continued down to subcutaneous tissue using blunt dissection.  Large seroma cavity in the right chest wall was aspirated and drained.  Incision and drainage was completed performed.  Cultures were obtained.  At this time to 15 Indonesian round Gaurang drains were placed through the opening in the medial aspect of the left chest and well to separate stab wounds with the trocars above and below the incision laterally.  Both drains were brought out completely and placed underneath the skin in the seroma cavity pocket.  The drains were secured in place with 3-0 nylon simple interrupted sutures.  Remaining seroma was drained and aspirated.  Incision and drainage site aspect of right chest wall was closed using 3-0 nylon running suture.  The bulbs were attached to the drain and there was good suction performed.  All seroma was completely drained.  Biopatch's were applied to the drain sites.  Sterile dressings applied to the drain site.  Compression dressing was applied.  The incision and drainage site was covered with gauze and sterile Tegaderm.  Patient was transferred off the operative table to recovery room extubated in stable condition.  All counts were correct at the end the case.      The role of my assistant was critical in the fact that they acted in retraction, exposure, the breast and axilla, as well as aiding in wound closure.        GONZALES AGUILAR JR., MD. [facility]  GENERAL SURGERY  [facility]    7/17/2025  10:01 AM  Juan Valle MD

## 2025-07-17 NOTE — BRIEF OP NOTE
Pre-Operative Diagnosis: Chest wall seroma     Post-Operative Diagnosis: Left-sided     Procedure Performed:   Incision and drainage of chest wall seroma, markedly    Surgeons and Role:     * Tu Magana MD - Primary    Assistant(s):  PA: Jacobo Valiente PA     Surgical Findings: Postmastectomy chest wall seroma     Specimen: Cultures     Estimated Blood Loss: Blood Output: 1 mL (7/17/2025  9:54 AM)      Dictation Number:      Tu Magana MD  7/17/2025  9:58 AM

## 2025-07-17 NOTE — ANESTHESIA PREPROCEDURE EVALUATION
Anesthesia PreOp Note    HPI:     Mary Reese Hatchett is a 74 year old female who presents for preoperative consultation requested by: Tu Magana MD    Date of Surgery: 7/17/2025    Procedure(s):  Drainage of right breast seroma with drain placement  Indication: Chest wall seroma    Relevant Problems   No relevant active problems       NPO:                         History Review:  Patient Active Problem List    Diagnosis Date Noted    Breast neoplasm, Tis (DCIS), right 08/09/2024    Ductal carcinoma in situ (DCIS) of right breast 07/10/2024    Status post left hip replacement 01/25/2022    Preoperative evaluation to rule out surgical contraindication 11/10/2021    Pulmonary HTN (HCC) 01/29/2021    Dyspnea on exertion 01/29/2021    Obstructive sleep apnea syndrome 01/29/2021    Stage 3 chronic kidney disease, unspecified whether stage 3a or 3b CKD (HCC) 10/21/2020    Stage 3a chronic kidney disease (HCC) 10/21/2020    History of hyperparathyroidism 12/10/2019    Anemia 06/15/2017    Hyperkalemia 06/15/2017    Primary osteoarthritis of both hips 06/02/2017    Pain in joint, pelvic region and thigh 03/31/2017    Edema 08/30/2016    Vitamin D deficiency, unspecified 03/15/2016    LVH (left ventricular hypertrophy) due to hypertensive disease 02/23/2016    Mitral regurgitation 02/23/2016    Tricuspid regurgitation 02/23/2016    DJD (degenerative joint disease) 02/16/2016    Essential hypertension 02/16/2016    H/O parathyroidectomy 02/16/2016    History of anemia 02/16/2016    Pure hypercholesterolemia 02/16/2016    Type 2 diabetes mellitus with diabetic chronic kidney disease (HCC) 02/16/2016       Past Medical History[1]    Past Surgical History[2]    Prescriptions Prior to Admission[3]  Current Medications and Prescriptions Ordered in Epic[4]    Allergies[5]    Family History[6]  Social Hx on file[7]    Available pre-op labs reviewed.     Lab Results   Component Value Date    PGLU 129 (H) 06/12/2025           Vital Signs:  Body mass index is 27.47 kg/m².   height is 1.715 m (5' 7.5\") and weight is 80.7 kg (178 lb).   Vitals:    07/16/25 1504   Weight: 80.7 kg (178 lb)   Height: 1.715 m (5' 7.5\")        Anesthesia Evaluation     Patient summary reviewed and Nursing notes reviewed    No history of anesthetic complications   Airway   Mallampati: III  TM distance: >3 FB  Neck ROM: full  Dental    (+) upper dentures and lower dentures    Pulmonary - normal exam    breath sounds clear to auscultation  (+) shortness of breath, sleep apnea  Cardiovascular   Exercise tolerance: good  (+) hypertension well controlled, valvular problems/murmurs, murmur    Rhythm: regular  Rate: normal    Neuro/Psych      GI/Hepatic/Renal      Endo/Other    (+) diabetes mellitus type 2 well controlled  Abdominal      Other findings: Upper and lower partials            Anesthesia Plan:   ASA:  3  Plan:   General  Airway:  LMA  Post-op Pain Management: IV analgesics  Plan Comments: Recent OR anesthesia record reviewed, LMA #4, PECs block.  LMA today, no block.  Informed Consent Plan and Risks Discussed With:  Patient  Use of Blood Products Discussed With:  Patient      I have informed Mary Reese Hatchett and/or legal guardian or family member of the nature of the anesthetic plan, benefits, risks including possible dental damage if relevant, major complications, and any alternative forms of anesthetic management.   All of the patient's questions were answered to the best of my ability. The patient desires the anesthetic management as planned.  Joaquín Higuera MD  7/17/2025 8:03 AM  Present on Admission:  **None**           [1]   Past Medical History:   Acute renal failure (ARF)    Back problem    low back pain    BRCA gene mutation negative in female    negative for 70 gene panel+RNA through [facility] aside from VUS in [facility]. Results in media tab    Cancer (HCC)    dcis rt breast    CKD (chronic kidney disease) stage 3, GFR 30-59 ml/min (HCC)     Diabetes (HCC)    diet conntrolled    DJD (degenerative joint disease)    Edema    Essential hypertension    Exposure to medical diagnostic radiation    H/O parathyroidectomy    High blood pressure    High cholesterol    High potassium    History of anemia    LVH (left ventricular hypertrophy) due to hypertensive disease    Mitral regurgitation    Neuropathy    legs/feet    Primary osteoarthritis of both hips    Pure hypercholesterolemia    Renal disorder    stage 3 CKD    Tricuspid regurgitation    Type 2 diabetes mellitus with diabetic chronic kidney disease (HCC)    Visual impairment    glasses    Vitamin D deficiency, unspecified   [2]   Past Surgical History:  Procedure Laterality Date    Colonoscopy  04/2010    Lumpectomy right Right 09/09/2024    Lumpectomy right Right     Reexcision    Mastectomy right Right 06/12/2025    Parathyroidectomy  08/2006    Root canal/non-surgical access      Total hip replacement Bilateral     2022/2023   [3]   No medications prior to admission.   [4]   No current Epic-ordered facility-administered medications on file.     Current Outpatient Medications Ordered in Epic   Medication Sig Dispense Refill    Multiple Vitamins-Minerals (MULTI-VITAMIN/MINERALS) Oral Tab Take 1 tablet by mouth before bedtime.      Cholecalciferol 125 MCG (5000 UT) Oral Tab Take 1 tablet (5,000 Units total) by mouth in the evening.      metoprolol tartrate 100 MG Oral Tab Take 1 tablet (100 mg total) by mouth 2 (two) times daily. 180 tablet 1    hydrALAZINE 100 MG Oral Tab Take 1 tablet (100 mg total) by mouth 3 (three) times daily. 270 tablet 1    cloNIDine 0.3 MG Oral Tab Take 1 tablet (0.3 mg total) by mouth 3 (three) times daily. 270 tablet 1    simvastatin 40 MG Oral Tab TAKE 1 TABLET BY MOUTH EVERY DAY IN THE EVENING 90 tablet 2   [5]   Allergies  Allergen Reactions    Gadolinium Derivatives ITCHING and SWELLING     Hands swollen, abdomin itching CONTRAST DYE    Other SWELLING     Contrast dye for  CT    Diltiazem OTHER (SEE COMMENTS)     Gum swelling    Lisinopril UNKNOWN    Hydrochlorothiazide W/Triamterene RASH   [6]   Family History  Problem Relation Age of Onset    Hypertension Mother     Stroke Mother 46    Heart Disease Father     Other (liver disease) Brother     Other (stomach issues) Brother     Other (hiv) Brother     Stroke Brother     No Known Problems Half-Brother     No Known Problems Half-Brother     No Known Problems Half-Brother     Alcohol and Other Disorders Associated Half-Brother     Other (HIV) Half-Brother     No Known Problems Half-Brother     Hypertension Half-Sister     Stroke Half-Sister     Other (history unknown) Half-Sister    [7]   Social History  Socioeconomic History    Marital status:    Occupational History    Occupation: Retired   Tobacco Use    Smoking status: Never    Smokeless tobacco: Never   Vaping Use    Vaping status: Never Used   Substance and Sexual Activity    Alcohol use: Yes     Comment: wine 2-3x week    Drug use: No

## 2025-07-17 NOTE — CONSULTS
Patient presents with:  Follow - Up: F/u: masectomy    HPI:   Mary R Hatchett is a 74 year old female.   The patient presents to the office for new diagnosis right breast DCIS.     Previous breast procedures include none.     Previous mammogram was on 9/14/2022    The patient has an appointment with medical oncology Dr. Renu Mcdonald on 8/14/2024  none    Breast density: B    Bilateral breast mammogram 6/11/2024:    IMPRESSION AND RECOMMENDATION:    1. Calcifications in the right breast require additional evaluation with diagnostic mammogram.     2. No specific mammographic evidence of malignancy in the left breast.    ANY FURTHER EVALUATION SHOULD BE BASED ON CLINICAL ASSESSMENT.    The Department of Radiology has informed the patient of their mammogram results by letter or by My  Chart and will contact the patient for needed follow up studies.    ---------------------------------------------------------------------------------------  ----------------------------------------    BI-RADS Final Assessment Category 0: Incomplete-Need Additional Imaging Evaluation  Management Recommendation: Recall for additional imaging.      Right breast mammogram 7/1/2024:  FINDINGS:     Additional images of the right breast were obtained. There is persistence of a 2 to 3 mm focal  grouping of indeterminate mild calcifications in the lateral, superior, far posterior aspect right  breast. There is no associated mass or distortion.  .  ----------------------------------------  IMPRESSION AND RECOMMENDATION    A focal developing grouping of microcalcifications in the right breast for which stereotactic biopsy  is recommended. Findings were discussed with patient.    ----------------------------------------  BI-RADS Category 4: Suspicious.  Recommendation: Biopsy should be performed in the absence of clinical contraindication.      Stereotactic right breast core biopsy 7/3/2024:  Impression   IMPRESSION:    *Successful stereotactic guided  biopsy of microcalcifications at the right breast 10:00 position. A  clip was placed. A vacuum-assisted device was utilized. Results will follow.    This was interpreted by Pranav Graham M.D.     Addendum    DATE OF SERVICE: 07.03.2024  Addendum:    Findings of the right breast stereotactic biopsy 10:00 position for microcalcifications demonstrates  DCIS and ADH. The findings are concordant. Findings were discussed with patient at 8:30 AM. This can  be localized under stereotactic guidance.    BI-RADS Category 6: Known Biopsy-Proven Malignancy  Recommendation: Surgical management as clinically appropriate.      This was interpreted by Pranav Graham M.D.     Final Diagnosis   Right breast, calcifications, 10:00, posterior, stereotactic needle core biopsy:  -Ductal carcinoma in situ (DCIS).  -Nuclear grade 1, cribriform type with dystrophic calcifications and necrosis.  -Largest focus of DCIS measures 5 mm (0.5 cm); 5% of submitted tissue.  -Atypical ductal hyperplasia (ADH).       Addendum 1   Quantitative Immunohistochemistry:   Material: Block A3  Population: Tumor Cells    Antibody & Clone Result Interpretation   Estrogen Receptor - SP1 >95 % Positive Cells Strong Positive   Progesterone Receptor - 16 2 % Positive Cells Strong Positive       8/7/2020 plan: Patient resents for follow-up evaluation for preop discussion for right breast DCIS.  Laboratory data chest x-ray on 7/11/2024: CBC normal, CMP normal GFR 35  Laboratory data 7/12/2024: BMP GFR 47.25    BRCA testing:pending    Breast Bra size:42 C    The patient did not see medical oncology or radiation oncology  The patient is scheduled to see pulmonology next week     PA lateral chest x-ray on 7/11/2024:  mpression  IMPRESSION:  Please see above regarding the right lung base density laterally.     CT scan of chest on 7/18/2024:  Impression   IMPRESSION:  * Small right pleural effusion with compressive atelectasis in the right lower lobe, accounting  for  radiograph described abnormality.    * Enlarged right hilar and mediastinal lymph nodes. While findings may reflect reactive lymphoid  hyperplasia, metastasis cannot be excluded. 3 months contrast enhanced CT chest follow-up is  recommended.    * Stable pulmonary micronodules.     MRI of the breast 7/12/2024:  mpression   IMPRESSION:    1. Biopsy-proven ductal carcinoma in situ right breast posterior one third aspect junction of the  upper outer, lower outer quadrant.    2. Right breast posterior third aspect approximately 6:00 position linear nonmasslike enhancement.  This is indeterminate and in the setting of breast conservation requires further evaluation with MRI  guided core needle biopsy.  Within the right breast posterior one third aspect 6:00 position approximately 3.6 cm anterior   medial to the biopsy-proven site of DCIS there is 1.8 cm linear nonmasslike enhancement demonstrated   on axial series slice 51, sagittally slice 234. This demonstrates rapid initial uptake with   persistent enhancement kinetics.     3. No MR evidence of malignancy in the left breast.    4. Asymmetric right-sided pleural thickening and associated enhancement. Conjunction with nodular  density demonstrated on prior chest radiograph, recommend further evaluation with chest CT scan.    BI-RADS Final Assessment Category 4: Suspicious.  Management Recommendation: Biopsy Should Be Performed in the Absence of Clinical Contraindication.     Right breast MRI guided core biopsy 7/22/2024:  Impression   IMPRESSION:   1. Successful right breast MRI guided, vacuum assisted core needle biopsy.  2. Successful marker clip placement with confirmation by right digital diagnostic mammogram.  3. See addendum for radiology pathology correlation.     Addendum    DATE OF SERVICE: 07.22.2024  PATHOLOGY ADDENDUM * PATHOLOGY ADDENDUM     Addendum, 7/25/2024 9:12 AM    Final pathology of DCIS nuclear grade 2, ER-positive MA negative is concordant  with imaging  findings. Surgical consult is recommended. Patient is under the care of Dr. Magana and has  follow-up with him on August 7,2024.    Above was discussed with the patient by Dr. Olivares on 7/25/2024.    PATHOLOGY ADDENDUM ENDS     Final Diagnosis   Right breast, lower central, MRI-guided core needle biopsy:  - Ductal carcinoma in situ (DCIS).   - Nuclear grade 2, cribriform type, with comedonecrosis and associated microcalcifications.   - Largest focus of DCIS measures 21 mm (2.1 cm); 10% of submitted tissue.        Addendum 1   Quantitative Immunohistochemistry:   Material: Block A1  Population: DCIS    Antibody & Clone Result Interpretation   Estrogen Receptor - SP1 >95 % Positive Cells strong Positive   Progesterone Receptor - 16 0 % Positive Cells Negative       9/17/2024: Patient presents for follow-up evaluation status post right breast lumpectomy with wire localization for DCIS with 2 separate sites on 9/9/2024.  Patient with complaints of none    Pathology reveals 1 cm a large focus of DCIS 8 cm in size close margins at the anterior skin superior margin less than 1 mm, inferior margin less than 1 mm  The patient saw Dr. Mcdonald preoperatively  Patient scheduled to see radiation oncology Dr. Canseco next Wednesday.  Patient is scheduled to see Dr. Mcdonald in 2 weeks.    Final Diagnosis   A- Right breast, lumpectomy:  -Ductal carcinoma in situ (DCIS) measuring 80 mm (8 cm) in the medial to lateral dimension.   -Nuclear grades 1 and 2, solid, cribriform and micropapillary type.   -Associated necrosis and microcalcifications.   -Atypical ductal hyperplasia (ADH).   -DCIS is less than 1 mm from the anterior margin.   -See specimens B-F for all remaining final margins.   -Biopsy site changes and clips are present.   -Background breast tissue with microcysts and columnar cell change.     B- Right breast, lateral margin, excision:  -Ductal carcinoma in situ (DCIS), multiple foci measuring up to 4 mm in  greatest extent.   -DCIS is less than 1 mm from the lateral margin.     C- Right breast, superior margin, excision:  -Ductal carcinoma in situ (DCIS) present at less than 1 mm from the superior margin.   -Deeper sections examined.     D- Right breast, inferior margin, excision:  -Ductal carcinoma in situ (DCIS), multiple foci measuring up to 5 mm in greatest extent.   -DCIS is less than 1 mm from the inferior margin.     E- Right breast, medial margin, excision:  -Fibrovascular and adipose tissue.   -New margin, negative for in situ carcinoma.     F- Right breast, deep margin, excision:  -Fibrovascular and adipose tissue.   -New margin, negative for in situ carcinoma.         [facility] SURGICAL CENTER OPERATIVE REPORT:     PATIENT NAME: Mary R Hatchett  : 1950   MRN: [id number]  SITE: [facility]      DATE OF OPERATION: 2024    PREOPERATIVE DIAGNOSIS: Right breast ductal carcinoma-in-situ (DCIS) multifocal multicentric with 2 separate sites  preoperative clinical stage 0      POSTOPERATIVE DIAGNOSIS: Same right  breast ductal carcinoma-in-situ (DCIS) multifocal multicentric 2 separate sites  postoperative clinical stage 0    PROCEDURE PERFORMED: Right breast lumpectomy with intraoperative lymphatic mapping and sentinel lymph node dissection, with intraoperative specimen mammogram 2 separate sites separate localizations    SURGEON: Tu Magana MD    ASST: DILLON Alanis (Assistant helped position patient and helped with positioning, retraction, suturing, closure, dressings etc.)    ANESTHESIA: General anesthesia     ESTIMATED BLOOD LOSS: 10 ml         10/9/2024: Patient presents for follow-up evaluation status post right breast lumpectomy with wire localization for DCIS with 2 separate sites on 2024.  Patient with complaints of none   Pathology reveals 1 cm a large focus of DCIS 8 cm in size close margins at the anterior skin superior margin less than 1 mm, inferior margin less than 1 mm and  lateral margin less than 1 mm  The patient saw Dr. Mcdonald preoperatively  Patient saw radiation oncology Dr. Canseco who recommended reexcision.   Patient saw Dr. Mcdonald who recommended reexcision.  She says her priority is reexcision and then will think over her options regarding both radiation and adjuvant endocrine therapy     10/25/2024:Patient presents for follow-up evaluation status post right breast lumpectomy with wire localization for DCIS with 2 separate sites on 2024 and reexcision on 10/15/2024:  Patient with complaints of none     Pathology   Final Diagnosis   Right breast, re-excision lumpectomy:  -Ductal carcinoma in situ (DCIS) measuring up to 7 mm (0.7 cm) on a single slide.  -Nuclear grades 1 and 2, cribriform and solid types.  -Associated necrosis and microcalcifications.  -DCIS is present on 4 of 15 slides examined.  -DCIS is present < 1 mm (<0.1 cm) from the medial and superior margins and 1 mm (0.1 cm) from the posterior margin. (See comment)  -One intramammary lymph node, negative for carcinoma (0/1).  -Background breast tissue with atypical ductal hyperplasia (ADH) and intraductal papilloma.  -Unremarkable skin.       The patient saw Dr. Mcdonald preoperatively  Patient saw radiation oncology Dr. Canseco who recommended reexcision.   Patient saw Dr. Mcdonald    She says her priority is reexcision and then will think over her options regarding both radiation and adjuvant endocrine therapy     [facility] SURGICAL CENTER OPERATIVE REPORT:     PATIENT NAME: Mary R Hatchett  : 1950   MRN: [id number]  SITE: [facility]      DATE OF OPERATION: 10/15/2024    PREOPERATIVE DIAGNOSIS: Right breast ductal carcinoma-in-situ (DCIS) close margin  preoperative clinical stage 0      POSTOPERATIVE DIAGNOSIS: Same Right  breast ductal carcinoma-in-situ (DCIS) close margins  postoperative clinical stage 0    PROCEDURE PERFORMED: Right breast reexcision lumpectomy    SURGEON: Tu Magana MD    ASST: Jazmine Coon  CSA(Assistant helped position patient and helped with positioning, retraction, suturing, closure, dressings etc.)    ANESTHESIA: General anesthesia     ESTIMATED BLOOD LOSS: 5 ml      5/9/2025: Patient presents for follow-up evaluation status post right breast lumpectomy with wire localization for DCIS with 2 separate sites on 9/9/2024 and reexcision on 10/15/2024:  Patient recently underwent a right breast ultrasound-guided core biopsy for a new right breast mass on 5/2/2025.  Pathology reveals recurrent DCIS of the right breast. Second biopsy reveals atypical glandular proliferation atypical ductal hyperplasia.  Patient with complaints of none     Patient had close reexcision of margins with DCIS  Patient0 right breast cancer.  Patient did undergo radiation therapy. Completed 1/23/2025 at [facility]  Patient is on antiestrogen therapy with Dr. Renu Mcdonald     Bilateral breast mammogram 4/23/2025:  IMPRESSION AND RECOMMENDATION:  Indeterminate right breast microcalcifications for which a 2 site representative stereotactic core  needle biopsy is recommended for further assessment. Given location within the inferior breast,  recommend biopsy on the prone biopsy unit. Recommend biopsy of the most anterior and posterior  aspect of the indeterminate microcalcifications. These findings were discussed with the patient by  Dr. Paz. The patient will be assisted in scheduling the biopsy and the referring physician will  be informed of the results and recommendations.    BI-RADS CATEGORY 4: SUSPICIOUS ABNORMALITY--BIOPSY SHOULD BE CONSIDERED:    The Department of Radiology has informed the patient of their results by letter and will contact the  patient for needed follow-up studies.      Right breast ultrasound-guided core biopsy x 2 on 5/4/2025:  Impression    Impression   IMPRESSION:   1. Successful right breast stereotactic, vacuum assisted core needle biopsy of calcifications in   the central outer breast at posterior  depth with placement of a stoplight shaped marker clip.   2. Successful right breast stereotactic, vacuum assisted core needle biopsy of calcifications in   the central outer breast at middle depth with placement of a tophat shaped marker clip.   3. Successful marker clip placements with confirmation by right digital diagnostic mammogram.   4. See addendum for radiology pathology correlation.     BI-RADS Final Assessment Category: Post Procedure Mammograms for Marker Placement.   Management Recommendation: Assess radiologic/pathologic concordance.   [facility], [place], [address]     Pathology:  inal Diagnosis   A. Right breast, central outer, posterior, with calcifications, stoplight clip, stereotactic needle core biopsy:  -Ductal carcinoma in situ (DCIS).   -Nuclear grade 1-2, cribriform type, with comedonecrosis and associated microcalcifications.   -Atypical ductal hyperplasia.  -Benign breast tissue with associated microcalcifications.    B. Right breast, central outer, mid depth, with calcifications,tophat clip, stereotactic needle core biopsy:  -Atypical glandular proliferation and atypical intraductal proliferation, please see comment.  -Foreign body giant cell reaction with associated microcalcifications.         Electronically signed by Anita Rodas on 5/9/2025 at 10:30 AM     Ancillary Studies [facility]   Quantitative Immunohistochemistry:   Material: Block A1  Population: DCIS    Antibody & Clone Result Interpretation   Estrogen Receptor - SP1 >95 % Positive Cells strong Positive   Progesterone Receptor - 16 0 % Positive Cells Negative       5/23/2025: Patient presents for preop discussion of recurrent right breast cancer.    The patient did see Dr. Mcdonald on 5/15/2025:  Who has recommended mastectomy   Patient with complaints of none    The patient is decided on right-sided mastectomy. Patient is contemplating reconstruction.    Bilateral breast MRI on  5/16/2025:  IMPRESSION      1. Post treatment changes of the right breast. 2 biopsy clip artifacts are seen from recent 2 site  stereotactic biopsy. There is no significant enhancement seen around both of these clips. Surgical  consult continues to be recommended.  2. No other abnormal areas of enhancement seen bilaterally.  3. No significant adenopathy seen.    BI-RADS Category 6: Known Biopsy-Proven Malignancy  Recommendation: Surgical management as clinically appropriate.        6/25/2025: Patient presents for follow-up evaluation status post right breast mastectomy with right axillary sentinel lymph node biopsy on 2025  Drain output is 10 mls  Patient with complaints of none    Pathology reveals Tis N0 0 stage 0    Final Diagnosis:     A. Right axilla sentinel lymph node #1; biopsy:  One lymph node, negative for malignancy (0/1)    B. [place] lymph node #2; biopsy:  One lymph node, negative for malignancy (0/1)    C. [place] lymph node #3:  One lymph node, negative for malignancy (0/1)    D. Right breast; mastectomy:  Lesion #1, 9 o'clock/posterior (2.6 x 2.4 x 2.0 cm)  Two small foci of residual ductal carcinoma in situ (slide D5), intermediate nuclear grade, cribriform type with associated microcalcifications  DCIS is 13 mm from the closest margin (posterior/deep margin)  Prominent prior procedural site changes present  Radiation therapy effect identified  No evidence of invasive carcinoma identified  Benign nipple  Pathologic TNM: r, pTis, pN0(sn) [See Synoptic Report]    Lesion #2, 9 o'clock/mid depth (1.5 x 1.3 x 1.0 cm):  Prior biopsy site changes present  Fibrocystic changes including usual duct hyperplasia, columnar cell change  Radiation therapy effect identified   No evidence of atypical hyperplasia, carcinoma in situ, or invasive carcinoma identified    E. Right breast skin; re-excision:   Benign skin with scar     [facility] OPERATING ROOM OPERATIVE REPORT:     PATIENT NAME: Harika Higuera  Hatchett  : 1950   MRN: [id number]  SITE: [facility]    DATE OF OPERATION: 2025    PREOPERATIVE DIAGNOSIS: Recurrent Right breast ductal carcinoma-in-situ (DCIS)   preoperative clinical stage 0    POSTOPERATIVE DIAGNOSIS: Same recurrent Right breast ductal carcinoma-in-situ (DCIS)   postoperative clinical stage 0    PROCEDURE PERFORMED: Right breast total mastectomy with partial chest wall resection with intraoperative lymphatic mapping and sentinel lymph node biopsy,     SURGEON: Tu Magana MD    ASST: DILLON Alanis (Assistant helped position patient and helped with positioning, retraction, suturing, closure, dressings etc.)    ANESTHESIA: General anesthesia     ESTIMATED BLOOD LOSS: 25 ml    COMPLICATIONS: none     2025: Patient presents for follow-up evaluation status post right breast mastectomy with right axillary sentinel lymph node biopsy on 2025    Patient with complaints of none     Pathology reveals Tis N0 0 stage 0  The patient did see medical oncology Dr. Renu Mcdonald   she has completed adjuvant radiation to right breast for her first incidence of DCIS; now s/p mastectomy and no need for adjuvant radiation     - I discussed she will need ongoing surveillance, which will include every 6-month clinical breast exams and yearly mammogram--next mammogram will be due in 2026  The patient has not opted for antiestrogen treatment.     Gyne History:  Menarche at age 10.   Age at birth of 1st child: none.   Number of pregnancies: .   Patient has history of breast feeding na.   Menopause at age 53.   Hormone replacement history: none.   Oral contraceptive pills taken for 12.   Family history of breast or ovarian cancer: none.    Allergies:    Diltiazem OTHER (SEE COMMENTS)  Comment:Gum swelling  Gadolinium Derivati* ITCHING, SWELLING  Comment:Hands swollen, abdomin itching CONTRAST DYE  Hydrochlorothiazide* RASH  Lisinopril UNKNOWN  Comment:Pt does not  remember   Current Meds:  Current Outpatient Medications   Medication Sig Dispense Refill   cloNIDine 0.3 MG Oral Tab Take 1 tablet (0.3 mg total) by mouth 3 (three) times daily. 270 tablet 3   metoprolol tartrate 100 MG Oral Tab Take 1 tablet (100 mg total) by mouth 2 (two) times daily. 180 tablet 3   simvastatin 40 MG Oral Tab Take 1 tablet (40 mg total) by mouth every evening. 90 tablet 3   hydrALAZINE 100 MG Oral Tab Take 1 tablet (100 mg total) by mouth 3 (three) times daily. 270 tablet 3   Cholecalciferol (VITAMIN D3) 1000 units Oral Cap Take by mouth.   Multiple Vitamins-Minerals (MULTI-VITAMIN/MINERALS) Oral Tab Take 1 tablet by mouth daily.       HISTORY:  Past Medical History:   Diagnosis Date   Acute renal failure (ARF) (Formerly [facility])   Breast cancer (Formerly [facility])   Cancer (HCC)   breast   CKD (chronic kidney disease) stage 3, GFR 30-59 ml/min (Formerly [facility]) 02/16/2016   Diabetes (Formerly [facility])   DIET CONTROLLED   Disorder of kidney and ureter   CKD3   DJD (degenerative joint disease) 02/16/2016   Edema 08/30/2016   Essential hypertension 02/16/2016   H/O parathyroidectomy 02/16/2016   High blood pressure   High cholesterol   High potassium   History of anemia 02/16/2016   Hyperlipidemia   LVH (left ventricular hypertrophy) due to hypertensive disease 02/23/2016   Mitral regurgitation 02/23/2016   Osteoarthritis   Peripheral polyneuropathy 04/28/2022   Primary osteoarthritis of both hips 06/02/2017   Pure hypercholesterolemia 02/16/2016   Tricuspid regurgitation 02/23/2016   Type 2 diabetes mellitus with diabetic chronic kidney disease (HCC) 02/16/2016   Vitamin D deficiency, unspecified 03/15/2016     Past Surgical History:   Procedure Laterality Date   COLONOSCOPY 04/2010   COLONOSCOPY STOMA DX INCLUDING COLLJ SPEC SPX   HIP REPLACEMENT SURGERY Bilateral   LUMPECTOMY RIGHT 09/09/2024   re excision on 10/15/24 dcis,, adh,   NEEDLE BIOPSY RIGHT   PARATHYROIDECTOMY 08/2006   RADIATION RIGHT   ROOT CANAL/NON-SURGICAL ACCESS     Family  History   Problem Relation Age of Onset   Heart Disease Father   Hypertension Mother   Stroke Mother   Stroke Sister   No Known Problems Brother   No Known Problems Brother   No Known Problems Brother   Alcohol and Other Disorders Associated Brother   Other (HIV) Brother   No Known Problems Brother   Hypertension Sister   Other (history unknown) Sister     Social History  Tobacco Use  Smoking status: Never  Smokeless tobacco: Never  Vaping Use  Vaping status: Never Used  Alcohol use: Not Currently  Comment: 1 drink/week  Drug use: No      ROS:   GENERAL HEALTH: otherwise feels well; no weight loss  SKIN: denies any unusual skin lesions or rashes  EYES: no visual complaints or deficits  HEENT: denies nasal congestion, sinus pain or sore throat; hearing loss negative  RESPIRATORY: denies shortness of breath, wheezing or cough   CARDIOVASCULAR: denies chest pain or JJ; no palpitations   GI: denies nausea, vomiting, constipation, diarrhea; no rectal bleeding; no heartburn  MUSCULOSKELETAL: no joint complaints upper or lower extremities, no back or rib pain  NEURO: no sensory or motor complaint  PSYCHE: no symptoms of depression or anxiety  HEMATOLOGY: denies hx anemia; denies bruising or excessive bleeding  ENDOCRINE: denies excessive thirst or urination; denies unexpected wt gain or wt loss  ALLERGY/IMM.: denies food or seasonal allergies    PHYSICAL EXAM:   General Examination:   NECK/THYROID: neck supple, full range of motion, no cervical lymphadenopathy.   LYMPH NODES: no axillary, supraclavicular adenopathy.   HEART: no murmurs, regular rate and rhythm,   LUNGS: clear to auscultation bilaterally.   ABDOMEN: normal, bowel sounds present, soft, nontender, nondistended, no ascites, no hepatosplenomegaly.   EXTREMITIES: No clubbing cyanosis or edema  BREAST EXAM:  Axillary Lymph Nodes: No supraclavicular, infraclavicular or axillary adenopathy present  Breast: LEFT BREAST: No palpable discrete mass present.   RIGHT  BREAST: biopsy site high upper outer 11:00  No hematoma or ecchymosis .  No palp discrete mass present    Nipple: DISCHARGE: None. RETRACTION: None. SKIN CHANGES: None.   8/7/2024: Right breast no palpable discrete mass present. Biopsy wounds healed. No hematoma or ecchymosis present.  9/17/2024: Right breast lumpectomy wound healing well. Steri-Strips in place. No evidence of infection. No hematoma.    10/9/2024: Right breast lumpectomy wound healing well. Steri-Strips in place. No evidence of infection. No hematoma.  10/25/2024: Right breast lumpectomy wound healing well. Steri-Strips in place. No hematoma. No seroma. No evidence of infection.  5/9/2025: Right breast lumpectomy changes and radiation changes present. Biopsy site at the 7:00 location with Steri-Strips in place mild ecchymosis. No hematoma.0  5/23/2025: Right breast lumpectomy and radiation changes present. There is firmness on the outside of the lateral right breast at the previous lumpectomy site. There is mild radiation changes involving the skin. No other palp discrete mass present. There is accessory nipple at the 6:00 location of the inframammary fold.  6/25/2025: Mastectomy wound healing well. Steri-Strips in place. No cellulitis. No infection.  7/16/2025: Right mastectomy wound healed well. Large seroma present inferior aspect of the wound. Chronic radiation changes present. No infection no cellulitis    ASSESSMENT/ PLAN:   Mary R Hatchett is a 74 year old female with a diagnosis of right breast DCIS x 2.  Patient status post right breast lumpectomy wire localization x 2 on 9/9/2024.  Pathology reveals a very large single focus of 8 cm of DCIS. No invasive cancer present. Margins are close less than 1 mm inferior and superior margins.  The patient did undergo radiation therapy and completed this at [facility] on 1/23/2025.  Patient is on Arimidex under the direction of Dr. Mcdonald.  Patient developed recurrent right breast DCIS.  Patient  status post right breast mastectomy bariatric sentinel node biopsy.  Pathology reveals Tis N0 M0 stage 0 right breast cancer.  2 drains were removed  I discussed pathology results with the patient and her friend no seroma present.  Patient has developed a large seroma of the right mastectomy chest wall. Had extensive discussion with the patient as well as her friend as etiology of the above. I discussed the need for drains placement to decrease seroma formation. I discussed seroma catheter versus placement in the operating room with the full size drain. Given the radiation changes present and the fact that surgery was 1 month ago I would recommend placement in the operating room with 1-2 drains.  I discussed risk of procedure including bleeding, infection, recurrent seroma formation, need for revision of skin flap, as well as has risk with anesthesia were discussed in detail with the patient as well as her friend both understand consent was proceed with the operation. Will plan drainage of right chest wall seroma with drain placement under general anesthetic at [facility] tomorrow July 17, 2025.    I spent 30 minutes on this patient visit. This included: preparing to see patient, obtaining history, reviewing separately obtained history, performing physical examination, independently interpreting results and discussing with patient, patient and family counseling, referring and communicating with other healthcare professionals, and care coordination    Juan Valle MD, Dr. Renu Canseco

## 2025-07-17 NOTE — DISCHARGE INSTRUCTIONS
Dr. Tu Magana     [phone]    DISCHARGE INSTRUCTIONS Breast surgery      Activity can be resumed as tolerated including walking, stairs, light exercise and driving short distances.    Heavy lifting (i.e. objects > 15-20 lbs.) is to be avoided for 3-4 weeks.    Normal time off work is one to two weeks.    Incisional  pains are commonly of moderate intensity in the first 24-48 hours and gradually improve over the initial post-operative week.    Please take tylenol extra strength 2 500mg tabs every 8 hours around the clock.    Also take Aleve 1 tablets every 12 hours around the clock.     Prescription pain medication (Tramadol) should be used initially according to the pain experienced and gradually weaned over the next few days.  Prescription pain medication can make you nauseated, light-headed and constipated: it should be taken with food and discontinued if side-effects develop.   A prescription for  stool softener (Colace) is helpful to avoid constipation. Take 1 orally twice a day. If  no bowel movement within 48 hours, Milk of Magnesia (MOM) 30 mls may be helpful.    Your diet should consist primarily of liquids until you have a good appetite, usually the first day after surgery.  Fatty or fried foods should be avoided in the first week or two after surgery but subsequently a general diet may be resumed.      You have drains in place.  These should be milked/stripped twice a day. Record the outputs of the drains each time you milk them.  Nurses will show you how to do this prior to discharge.    Dressings:   Dressing should be changed daily.   Change dressing daily around drain.  Leave silver disc in place, secure with medipore tape.  Wear surgical compression bra.  Dressing should be replaced with gauze and tape. Additional dressing changes may be changed if gauze becomes saturated.    Sponge bath only.  Where good mammary support such as a support bra or the breast support that was given to you in  the hospital, day and night for 1 week.    Drains:   Record output twice a day on sheet.  Use measuring cups to record output.   Activity after surgery  After surgery, take it easy for the rest of the day. If you had general anesthesia, don’t use machinery or power tools, drink alcohol, or make any major decisions for at least the first 24 hours.  Don’t drive while you are still taking opioid pain medication, and don’t drive u.ntil you are able to step firmly on the brake pedal without hesitation.  Ask others to help with chores and errands while you recover.  Don’t lift anything heavier than 10 pounds until your doctor says it’s OK.  Don’t mow the lawn, shovel snow, use a vacuum , or do other strenuous activities until your doctor says it’s okay.  Walk as often as you feel able.  Continue the coughing and deep breathing exercises that you learned in the hospital.  Ask your doctor when you can expect to return to work.  Avoid constipation:  Eat fruits, vegetables, and whole grains   Drink 6-8 glasses of water a day, unless otherwise instructed.  Use a laxative or a mild stool softener as instructed by your doctor.  Call your doctor, or the 24-hour answering service, immediately if you have any of the following:  Yellowing of your eyes or skin (jaundice)  Chills  Fever above 101.5°F or 38.5°C    Redness, swelling, increasing pain, pus, or a foul smell at the incision site  Dark or rust-colored urine  Stool that is kirill-colored or light in color instead of brown  Increasing abdominal pain  persistent nausea or bowel problems, uncontrolled pain or poor appetite     Contact the office tomorrow to make a follow appointment for 10-14 days after surgery, on 7/30/25.    Instructions given by DILLON Alanis MD. [facility]  GENERAL SURGERY  [facility]  OFFICE [phone]    7/17/2025  9:07 AM       HOME INSTRUCTIONS  [facility] HOME CARE INSTRUCTIONS: POST-OP ANESTHESIA  The medication  that you received for sedation or general anesthesia can last up to 24 hours. Your judgment and reflexes may be altered, even if you feel like your normal self.      We Recommend:   Do not drive any motor vehicle or bicycle   Avoid mowing the lawn, playing sports, or working with power tools/applicances (power saws, electric knives or mixers)   That you have someone stay with you on your first night home   Do not drink alcohol or take sleeping pills or tranquilizers   Do not sign legal documents within 24 hours of your procedure   If you had a nerve block for your surgery, take extra care not to put any pressure on your arm or hand for 24 hours    It is normal:  For you to have a sore throat if you had a breathing tube during surgery (while you were asleep!). The sore throat should get better within 48 hours. You can gargle with warm salt water (1/2 tsp in 4 oz warm water) or use a throat lozenge for comfort  To feel muscle aches or soreness especially in the abdomen, chest or neck. The achy feeling should go away in the next 24 hours  To feel weak, sleepy or \"wiped out\". Your should start feeling better in the next 24 hours.   To experience mild discomforts such as sore lip or tongue, headache, cramps, gas pains or a bloated feeling in your abdomen.   To experience mild back pain or soreness for a day or two if you had spinal or epidural anesthesia.   If you had laparoscopic surgery, to feel shoulder pain or discomfort on the day of surgery.   For some patients to have nausea after surgery/anesthesia    If you feel nausea or experience vomiting:   Try to move around less.   Eat less than usual or drink only liquids until the next morning   Nausea should resolve in about 24 hours    If you have a problem when you are at home:    Call your surgeons office   Discharge Instructions: After Your Surgery  You’ve just had surgery. During surgery, you were given medicine called anesthesia to keep you relaxed and free of  pain. After surgery, you may have some pain or nausea. This is common. Here are some tips for feeling better and getting well after surgery.   Going home  Your healthcare provider will show you how to take care of yourself when you go home. They'll also answer your questions. Have an adult family member or friend drive you home. For the first 24 hours after your surgery:   Don't drive or use heavy equipment.  Don't make important decisions or sign legal papers.  Take medicines as directed.  Don't drink alcohol.  Have someone stay with you, if needed. They can watch for problems and help keep you safe.  Be sure to go to all follow-up visits with your healthcare provider. And rest after your surgery for as long as your provider tells you to.   Coping with pain  If you have pain after surgery, pain medicine will help you feel better. Take it as directed, before pain becomes severe. Also, ask your healthcare provider or pharmacist about other ways to control pain. This might be with heat, ice, or relaxation. And follow any other instructions your surgeon or nurse gives you.      Stay on schedule with your medicine.     Tips for taking pain medicine  To get the best relief possible, remember these points:   Pain medicines can upset your stomach. Taking them with a little food may help.  Most pain relievers taken by mouth need at least 20 to 30 minutes to start to work.  Don't wait till your pain becomes severe before you take your medicine. Try to time your medicine so that you can take it before starting an activity. This might be before you get dressed, go for a walk, or sit down for dinner.  Constipation is a common side effect of some pain medicines. Call your healthcare provider before taking any medicines, such as laxatives or stool softeners, to help ease constipation. Also ask if you should skip any foods. Drinking lots of fluids and eating foods, such as fruits and vegetables, that are high in fiber can also  help. Remember, don't take laxatives unless your surgeon has prescribed them.  Drinking alcohol and taking pain medicine can cause dizziness and slow your breathing. It can even be deadly. Don't drink alcohol while taking pain medicine.  Pain medicine can make you react more slowly to things. Don't drive or run machinery while taking pain medicine.  Your healthcare provider may tell you to take acetaminophen to help ease your pain. Ask them how much you're supposed to take each day. Acetaminophen or other pain relievers may interact with your prescription medicines or other over-the-counter (OTC) medicines. Some prescription medicines have acetaminophen and other ingredients in them. Using both prescription and OTC acetaminophen for pain can cause you to accidentally overdose. Read the labels on your OTC medicines with care. This will help you to clearly know the list of ingredients, how much to take, and any warnings. It may also help you not take too much acetaminophen. If you have questions or don't understand the information, ask your pharmacist or healthcare provider to explain it to you before you take the OTC medicine.   Managing nausea  Some people have an upset stomach (nausea) after surgery. This is often because of anesthesia, pain, or pain medicine, less movement of food in the stomach, or the stress of surgery. These tips will help you handle nausea and eat healthy foods as you get better. If you were on a special food plan before surgery, ask your healthcare provider if you should follow it while you get better. Check with your provider on how your eating should progress. It may depend on the surgery you had. These general tips may help:   Don't push yourself to eat. Your body will tell you when to eat and how much.  Start off with clear liquids and soup. They're easier to digest.  Next try semi-solid foods as you feel ready. These include mashed potatoes, applesauce, and gelatin.  Slowly move to solid  foods. Don’t eat fatty, rich, or spicy foods at first.  Don't force yourself to have 3 large meals a day. Instead eat smaller amounts more often.  Take pain medicines with a small amount of solid food, such as crackers or toast. This helps prevent nausea.  When to call your healthcare provider  Call your healthcare provider right away if you have any of these:   You still have too much pain, or the pain gets worse, after taking the medicine. The medicine may not be strong enough. Or there may be a complication from the surgery.  You feel too sleepy, dizzy, or groggy. The medicine may be too strong.  Side effects, such as nausea or vomiting. Your healthcare provider may advise taking other medicines to treat these or may change your treatment plan..  Skin changes, such as rash, itching, or hives. This may mean you have an allergic reaction. Your provider may advise taking other medicines.  The incision looks different (for instance, part of it opens up).  Bleeding or fluid leaking from the incision site, and you weren't told to expect that.  Fever of 100.4°F (38°C) or higher, or as directed by your healthcare provider.  Call 911  Call 911 right away if you have:   Trouble breathing  Facial swelling    If you have obstructive sleep apnea   You were given anesthesia medicine during surgery to keep you comfortable and free of pain. After surgery, you may have more apnea spells because of this medicine and other medicines you were given. The spells may last longer than normal.    At home:  Keep using the continuous positive airway pressure (CPAP) device when you sleep. Unless your healthcare provider tells you not to, use it when you sleep, day or night. CPAP is a common device used to treat obstructive sleep apnea.  Talk with your provider before taking any pain medicine, muscle relaxants, or sedatives. Your provider will tell you about the possible dangers of taking these medicines.  Contact your provider if your  sleeping changes a lot even when taking medicines as directed.  [employer] last reviewed this educational content on 4/1/2024  This information is for informational purposes only. This is not intended to be a substitute for professional medical advice, diagnosis, or treatment. Always seek the advice and follow the directions from your physician or other qualified health care provider.  © 0205-9600 [employer]. All rights reserved. This information is not intended as a substitute for professional medical care. Always follow your healthcare professional's instructions.

## 2025-07-17 NOTE — H&P (VIEW-ONLY)
Patient presents with:  Follow - Up: F/u: masectomy    HPI:   Mary R Hatchett is a 74 year old female.   The patient presents to the office for new diagnosis right breast DCIS.     Previous breast procedures include none.     Previous mammogram was on 9/14/2022    The patient has an appointment with medical oncology Dr. Renu Mcdonald on 8/14/2024  none    Breast density: B    Bilateral breast mammogram 6/11/2024:    IMPRESSION AND RECOMMENDATION:    1. Calcifications in the right breast require additional evaluation with diagnostic mammogram.     2. No specific mammographic evidence of malignancy in the left breast.    ANY FURTHER EVALUATION SHOULD BE BASED ON CLINICAL ASSESSMENT.    The Department of Radiology has informed the patient of their mammogram results by letter or by My  Chart and will contact the patient for needed follow up studies.    ---------------------------------------------------------------------------------------  ----------------------------------------    BI-RADS Final Assessment Category 0: Incomplete-Need Additional Imaging Evaluation  Management Recommendation: Recall for additional imaging.      Right breast mammogram 7/1/2024:  FINDINGS:     Additional images of the right breast were obtained. There is persistence of a 2 to 3 mm focal  grouping of indeterminate mild calcifications in the lateral, superior, far posterior aspect right  breast. There is no associated mass or distortion.  .  ----------------------------------------  IMPRESSION AND RECOMMENDATION    A focal developing grouping of microcalcifications in the right breast for which stereotactic biopsy  is recommended. Findings were discussed with patient.    ----------------------------------------  BI-RADS Category 4: Suspicious.  Recommendation: Biopsy should be performed in the absence of clinical contraindication.      Stereotactic right breast core biopsy 7/3/2024:  Impression   IMPRESSION:    *Successful stereotactic guided  biopsy of microcalcifications at the right breast 10:00 position. A  clip was placed. A vacuum-assisted device was utilized. Results will follow.    This was interpreted by Pranav Graham M.D.     Addendum    DATE OF SERVICE: 07.03.2024  Addendum:    Findings of the right breast stereotactic biopsy 10:00 position for microcalcifications demonstrates  DCIS and ADH. The findings are concordant. Findings were discussed with patient at 8:30 AM. This can  be localized under stereotactic guidance.    BI-RADS Category 6: Known Biopsy-Proven Malignancy  Recommendation: Surgical management as clinically appropriate.      This was interpreted by Pranav Graham M.D.     Final Diagnosis   Right breast, calcifications, 10:00, posterior, stereotactic needle core biopsy:  -Ductal carcinoma in situ (DCIS).  -Nuclear grade 1, cribriform type with dystrophic calcifications and necrosis.  -Largest focus of DCIS measures 5 mm (0.5 cm); 5% of submitted tissue.  -Atypical ductal hyperplasia (ADH).       Addendum 1   Quantitative Immunohistochemistry:   Material: Block A3  Population: Tumor Cells    Antibody & Clone Result Interpretation   Estrogen Receptor - SP1 >95 % Positive Cells Strong Positive   Progesterone Receptor - 16 2 % Positive Cells Strong Positive       8/7/2020 plan: Patient resents for follow-up evaluation for preop discussion for right breast DCIS.  Laboratory data chest x-ray on 7/11/2024: CBC normal, CMP normal GFR 35  Laboratory data 7/12/2024: BMP GFR 47.25    BRCA testing:pending    Breast Bra size:42 C    The patient did not see medical oncology or radiation oncology  The patient is scheduled to see pulmonology next week     PA lateral chest x-ray on 7/11/2024:  mpression  IMPRESSION:  Please see above regarding the right lung base density laterally.     CT scan of chest on 7/18/2024:  Impression   IMPRESSION:  * Small right pleural effusion with compressive atelectasis in the right lower lobe, accounting  for  radiograph described abnormality.    * Enlarged right hilar and mediastinal lymph nodes. While findings may reflect reactive lymphoid  hyperplasia, metastasis cannot be excluded. 3 months contrast enhanced CT chest follow-up is  recommended.    * Stable pulmonary micronodules.     MRI of the breast 7/12/2024:  mpression   IMPRESSION:    1. Biopsy-proven ductal carcinoma in situ right breast posterior one third aspect junction of the  upper outer, lower outer quadrant.    2. Right breast posterior third aspect approximately 6:00 position linear nonmasslike enhancement.  This is indeterminate and in the setting of breast conservation requires further evaluation with MRI  guided core needle biopsy.  Within the right breast posterior one third aspect 6:00 position approximately 3.6 cm anterior   medial to the biopsy-proven site of DCIS there is 1.8 cm linear nonmasslike enhancement demonstrated   on axial series slice 51, sagittally slice 234. This demonstrates rapid initial uptake with   persistent enhancement kinetics.     3. No MR evidence of malignancy in the left breast.    4. Asymmetric right-sided pleural thickening and associated enhancement. Conjunction with nodular  density demonstrated on prior chest radiograph, recommend further evaluation with chest CT scan.    BI-RADS Final Assessment Category 4: Suspicious.  Management Recommendation: Biopsy Should Be Performed in the Absence of Clinical Contraindication.     Right breast MRI guided core biopsy 7/22/2024:  Impression   IMPRESSION:   1. Successful right breast MRI guided, vacuum assisted core needle biopsy.  2. Successful marker clip placement with confirmation by right digital diagnostic mammogram.  3. See addendum for radiology pathology correlation.     Addendum    DATE OF SERVICE: 07.22.2024  PATHOLOGY ADDENDUM * PATHOLOGY ADDENDUM     Addendum, 7/25/2024 9:12 AM    Final pathology of DCIS nuclear grade 2, ER-positive MI negative is concordant  with imaging  findings. Surgical consult is recommended. Patient is under the care of Dr. Magana and has  follow-up with him on August 7,2024.    Above was discussed with the patient by Dr. Olivares on 7/25/2024.    PATHOLOGY ADDENDUM ENDS     Final Diagnosis   Right breast, lower central, MRI-guided core needle biopsy:  - Ductal carcinoma in situ (DCIS).   - Nuclear grade 2, cribriform type, with comedonecrosis and associated microcalcifications.   - Largest focus of DCIS measures 21 mm (2.1 cm); 10% of submitted tissue.        Addendum 1   Quantitative Immunohistochemistry:   Material: Block A1  Population: DCIS    Antibody & Clone Result Interpretation   Estrogen Receptor - SP1 >95 % Positive Cells strong Positive   Progesterone Receptor - 16 0 % Positive Cells Negative       9/17/2024: Patient presents for follow-up evaluation status post right breast lumpectomy with wire localization for DCIS with 2 separate sites on 9/9/2024.  Patient with complaints of none    Pathology reveals 1 cm a large focus of DCIS 8 cm in size close margins at the anterior skin superior margin less than 1 mm, inferior margin less than 1 mm  The patient saw Dr. Mcdonald preoperatively  Patient scheduled to see radiation oncology Dr. Canseco next Wednesday.  Patient is scheduled to see Dr. Mcdonald in 2 weeks.    Final Diagnosis   A- Right breast, lumpectomy:  -Ductal carcinoma in situ (DCIS) measuring 80 mm (8 cm) in the medial to lateral dimension.   -Nuclear grades 1 and 2, solid, cribriform and micropapillary type.   -Associated necrosis and microcalcifications.   -Atypical ductal hyperplasia (ADH).   -DCIS is less than 1 mm from the anterior margin.   -See specimens B-F for all remaining final margins.   -Biopsy site changes and clips are present.   -Background breast tissue with microcysts and columnar cell change.     B- Right breast, lateral margin, excision:  -Ductal carcinoma in situ (DCIS), multiple foci measuring up to 4 mm in  greatest extent.   -DCIS is less than 1 mm from the lateral margin.     C- Right breast, superior margin, excision:  -Ductal carcinoma in situ (DCIS) present at less than 1 mm from the superior margin.   -Deeper sections examined.     D- Right breast, inferior margin, excision:  -Ductal carcinoma in situ (DCIS), multiple foci measuring up to 5 mm in greatest extent.   -DCIS is less than 1 mm from the inferior margin.     E- Right breast, medial margin, excision:  -Fibrovascular and adipose tissue.   -New margin, negative for in situ carcinoma.     F- Right breast, deep margin, excision:  -Fibrovascular and adipose tissue.   -New margin, negative for in situ carcinoma.         [facility] OPERATIVE REPORT:     PATIENT NAME: Mary R Hatchett  : 1950   MRN: [id number]  SITE: [facility]      DATE OF OPERATION: 2024    PREOPERATIVE DIAGNOSIS: Right breast ductal carcinoma-in-situ (DCIS) multifocal multicentric with 2 separate sites  preoperative clinical stage 0      POSTOPERATIVE DIAGNOSIS: Same right  breast ductal carcinoma-in-situ (DCIS) multifocal multicentric 2 separate sites  postoperative clinical stage 0    PROCEDURE PERFORMED: Right breast lumpectomy with intraoperative lymphatic mapping and sentinel lymph node dissection, with intraoperative specimen mammogram 2 separate sites separate localizations    SURGEON: Tu Magana MD    ASST: DILLON Alanis (Assistant helped position patient and helped with positioning, retraction, suturing, closure, dressings etc.)    ANESTHESIA: General anesthesia     ESTIMATED BLOOD LOSS: 10 ml         10/9/2024: Patient presents for follow-up evaluation status post right breast lumpectomy with wire localization for DCIS with 2 separate sites on 2024.  Patient with complaints of none   Pathology reveals 1 cm a large focus of DCIS 8 cm in size close margins at the anterior skin superior margin less than 1 mm, inferior margin less than 1 mm and  lateral margin less than 1 mm  The patient saw Dr. Mcdonald preoperatively  Patient saw radiation oncology Dr. Canseco who recommended reexcision.   Patient saw Dr. Mcdonald who recommended reexcision.  She says her priority is reexcision and then will think over her options regarding both radiation and adjuvant endocrine therapy     10/25/2024:Patient presents for follow-up evaluation status post right breast lumpectomy with wire localization for DCIS with 2 separate sites on 2024 and reexcision on 10/15/2024:  Patient with complaints of none     Pathology   Final Diagnosis   Right breast, re-excision lumpectomy:  -Ductal carcinoma in situ (DCIS) measuring up to 7 mm (0.7 cm) on a single slide.  -Nuclear grades 1 and 2, cribriform and solid types.  -Associated necrosis and microcalcifications.  -DCIS is present on 4 of 15 slides examined.  -DCIS is present < 1 mm (<0.1 cm) from the medial and superior margins and 1 mm (0.1 cm) from the posterior margin. (See comment)  -One intramammary lymph node, negative for carcinoma (0/1).  -Background breast tissue with atypical ductal hyperplasia (ADH) and intraductal papilloma.  -Unremarkable skin.       The patient saw Dr. Mcdonald preoperatively  Patient saw radiation oncology Dr. Canseco who recommended reexcision.   Patient saw Dr. Mcdonald    She says her priority is reexcision and then will think over her options regarding both radiation and adjuvant endocrine therapy     [facility] OPERATIVE REPORT:     PATIENT NAME: Mary R Hatchett  : 1950   MRN: [id number]  SITE: [facility]      DATE OF OPERATION: 10/15/2024    PREOPERATIVE DIAGNOSIS: Right breast ductal carcinoma-in-situ (DCIS) close margin  preoperative clinical stage 0      POSTOPERATIVE DIAGNOSIS: Same Right  breast ductal carcinoma-in-situ (DCIS) close margins  postoperative clinical stage 0    PROCEDURE PERFORMED: Right breast reexcision lumpectomy    SURGEON: Tu Magana MD    ASST: Jazmine Coon  CSA(Assistant helped position patient and helped with positioning, retraction, suturing, closure, dressings etc.)    ANESTHESIA: General anesthesia     ESTIMATED BLOOD LOSS: 5 ml      5/9/2025: Patient presents for follow-up evaluation status post right breast lumpectomy with wire localization for DCIS with 2 separate sites on 9/9/2024 and reexcision on 10/15/2024:  Patient recently underwent a right breast ultrasound-guided core biopsy for a new right breast mass on 5/2/2025.  Pathology reveals recurrent DCIS of the right breast. Second biopsy reveals atypical glandular proliferation atypical ductal hyperplasia.  Patient with complaints of none     Patient had close reexcision of margins with DCIS  Patient0 right breast cancer.  Patient did undergo radiation therapy. Completed 1/23/2025 at [facility]  Patient is on antiestrogen therapy with Dr. Renu Mcdonald     Bilateral breast mammogram 4/23/2025:  IMPRESSION AND RECOMMENDATION:  Indeterminate right breast microcalcifications for which a 2 site representative stereotactic core  needle biopsy is recommended for further assessment. Given location within the inferior breast,  recommend biopsy on the prone biopsy unit. Recommend biopsy of the most anterior and posterior  aspect of the indeterminate microcalcifications. These findings were discussed with the patient by  Dr. Paz. The patient will be assisted in scheduling the biopsy and the referring physician will  be informed of the results and recommendations.    BI-RADS CATEGORY 4: SUSPICIOUS ABNORMALITY--BIOPSY SHOULD BE CONSIDERED:    The Department of Radiology has informed the patient of their results by letter and will contact the  patient for needed follow-up studies.      Right breast ultrasound-guided core biopsy x 2 on 5/4/2025:  Impression    Impression   IMPRESSION:   1. Successful right breast stereotactic, vacuum assisted core needle biopsy of calcifications in   the central outer breast at posterior  depth with placement of a stoplight shaped marker clip.   2. Successful right breast stereotactic, vacuum assisted core needle biopsy of calcifications in   the central outer breast at middle depth with placement of a tophat shaped marker clip.   3. Successful marker clip placements with confirmation by right digital diagnostic mammogram.   4. See addendum for radiology pathology correlation.     BI-RADS Final Assessment Category: Post Procedure Mammograms for Marker Placement.   Management Recommendation: Assess radiologic/pathologic concordance.   [facility], [place], [address]     Pathology:  inal Diagnosis   A. Right breast, central outer, posterior, with calcifications, stoplight clip, stereotactic needle core biopsy:  -Ductal carcinoma in situ (DCIS).   -Nuclear grade 1-2, cribriform type, with comedonecrosis and associated microcalcifications.   -Atypical ductal hyperplasia.  -Benign breast tissue with associated microcalcifications.    B. Right breast, central outer, mid depth, with calcifications,tophat clip, stereotactic needle core biopsy:  -Atypical glandular proliferation and atypical intraductal proliferation, please see comment.  -Foreign body giant cell reaction with associated microcalcifications.         Electronically signed by Anita Rodas on 5/9/2025 at 10:30 AM     Ancillary Studies [facility]   Quantitative Immunohistochemistry:   Material: Block A1  Population: DCIS    Antibody & Clone Result Interpretation   Estrogen Receptor - SP1 >95 % Positive Cells strong Positive   Progesterone Receptor - 16 0 % Positive Cells Negative       5/23/2025: Patient presents for preop discussion of recurrent right breast cancer.    The patient did see Dr. Mcdonald on 5/15/2025:  Who has recommended mastectomy   Patient with complaints of none    The patient is decided on right-sided mastectomy. Patient is contemplating reconstruction.    Bilateral breast MRI on  5/16/2025:  IMPRESSION      1. Post treatment changes of the right breast. 2 biopsy clip artifacts are seen from recent 2 site  stereotactic biopsy. There is no significant enhancement seen around both of these clips. Surgical  consult continues to be recommended.  2. No other abnormal areas of enhancement seen bilaterally.  3. No significant adenopathy seen.    BI-RADS Category 6: Known Biopsy-Proven Malignancy  Recommendation: Surgical management as clinically appropriate.        6/25/2025: Patient presents for follow-up evaluation status post right breast mastectomy with right axillary sentinel lymph node biopsy on 2025  Drain output is 10 mls  Patient with complaints of none    Pathology reveals Tis N0 0 stage 0    Final Diagnosis:     A. Right axilla sentinel lymph node #1; biopsy:  One lymph node, negative for malignancy (0/1)    B. [place] lymph node #2; biopsy:  One lymph node, negative for malignancy (0/1)    C. [place] lymph node #3:  One lymph node, negative for malignancy (0/1)    D. Right breast; mastectomy:  Lesion #1, 9 o'clock/posterior (2.6 x 2.4 x 2.0 cm)  Two small foci of residual ductal carcinoma in situ (slide D5), intermediate nuclear grade, cribriform type with associated microcalcifications  DCIS is 13 mm from the closest margin (posterior/deep margin)  Prominent prior procedural site changes present  Radiation therapy effect identified  No evidence of invasive carcinoma identified  Benign nipple  Pathologic TNM: r, pTis, pN0(sn) [See Synoptic Report]    Lesion #2, 9 o'clock/mid depth (1.5 x 1.3 x 1.0 cm):  Prior biopsy site changes present  Fibrocystic changes including usual duct hyperplasia, columnar cell change  Radiation therapy effect identified   No evidence of atypical hyperplasia, carcinoma in situ, or invasive carcinoma identified    E. Right breast skin; re-excision:   Benign skin with scar     [facility] OPERATING ROOM OPERATIVE REPORT:     PATIENT NAME: Harika Higuera  Hatchett  : 1950   MRN: [id number]  SITE: [facility]    DATE OF OPERATION: 2025    PREOPERATIVE DIAGNOSIS: Recurrent Right breast ductal carcinoma-in-situ (DCIS)   preoperative clinical stage 0    POSTOPERATIVE DIAGNOSIS: Same recurrent Right breast ductal carcinoma-in-situ (DCIS)   postoperative clinical stage 0    PROCEDURE PERFORMED: Right breast total mastectomy with partial chest wall resection with intraoperative lymphatic mapping and sentinel lymph node biopsy,     SURGEON: Tu Magana MD    ASST: DILLON Alanis (Assistant helped position patient and helped with positioning, retraction, suturing, closure, dressings etc.)    ANESTHESIA: General anesthesia     ESTIMATED BLOOD LOSS: 25 ml    COMPLICATIONS: none     2025: Patient presents for follow-up evaluation status post right breast mastectomy with right axillary sentinel lymph node biopsy on 2025    Patient with complaints of none     Pathology reveals Tis N0 0 stage 0  The patient did see medical oncology Dr. Renu Mcdonald   she has completed adjuvant radiation to right breast for her first incidence of DCIS; now s/p mastectomy and no need for adjuvant radiation     - I discussed she will need ongoing surveillance, which will include every 6-month clinical breast exams and yearly mammogram--next mammogram will be due in 2026  The patient has not opted for antiestrogen treatment.     Gyne History:  Menarche at age 10.   Age at birth of 1st child: none.   Number of pregnancies: .   Patient has history of breast feeding na.   Menopause at age 53.   Hormone replacement history: none.   Oral contraceptive pills taken for 12.   Family history of breast or ovarian cancer: none.    Allergies:    Diltiazem OTHER (SEE COMMENTS)  Comment:Gum swelling  Gadolinium Derivati* ITCHING, SWELLING  Comment:Hands swollen, abdomin itching CONTRAST DYE  Hydrochlorothiazide* RASH  Lisinopril UNKNOWN  Comment:Pt does not  remember   Current Meds:  Current Outpatient Medications   Medication Sig Dispense Refill   cloNIDine 0.3 MG Oral Tab Take 1 tablet (0.3 mg total) by mouth 3 (three) times daily. 270 tablet 3   metoprolol tartrate 100 MG Oral Tab Take 1 tablet (100 mg total) by mouth 2 (two) times daily. 180 tablet 3   simvastatin 40 MG Oral Tab Take 1 tablet (40 mg total) by mouth every evening. 90 tablet 3   hydrALAZINE 100 MG Oral Tab Take 1 tablet (100 mg total) by mouth 3 (three) times daily. 270 tablet 3   Cholecalciferol (VITAMIN D3) 1000 units Oral Cap Take by mouth.   Multiple Vitamins-Minerals (MULTI-VITAMIN/MINERALS) Oral Tab Take 1 tablet by mouth daily.       HISTORY:  Past Medical History:   Diagnosis Date   Acute renal failure (ARF) ([facility])   Breast cancer ([facility])   Cancer (HCC)   breast   CKD (chronic kidney disease) stage 3, GFR 30-59 ml/min ([facility]) 02/16/2016   Diabetes ([facility])   DIET CONTROLLED   Disorder of kidney and ureter   CKD3   DJD (degenerative joint disease) 02/16/2016   Edema 08/30/2016   Essential hypertension 02/16/2016   H/O parathyroidectomy 02/16/2016   High blood pressure   High cholesterol   High potassium   History of anemia 02/16/2016   Hyperlipidemia   LVH (left ventricular hypertrophy) due to hypertensive disease 02/23/2016   Mitral regurgitation 02/23/2016   Osteoarthritis   Peripheral polyneuropathy 04/28/2022   Primary osteoarthritis of both hips 06/02/2017   Pure hypercholesterolemia 02/16/2016   Tricuspid regurgitation 02/23/2016   Type 2 diabetes mellitus with diabetic chronic kidney disease (HCC) 02/16/2016   Vitamin D deficiency, unspecified 03/15/2016     Past Surgical History:   Procedure Laterality Date   COLONOSCOPY 04/2010   COLONOSCOPY STOMA DX INCLUDING COLLJ SPEC SPX   HIP REPLACEMENT SURGERY Bilateral   LUMPECTOMY RIGHT 09/09/2024   re excision on 10/15/24 dcis,, adh,   NEEDLE BIOPSY RIGHT   PARATHYROIDECTOMY 08/2006   RADIATION RIGHT   ROOT CANAL/NON-SURGICAL ACCESS     Family  History   Problem Relation Age of Onset   Heart Disease Father   Hypertension Mother   Stroke Mother   Stroke Sister   No Known Problems Brother   No Known Problems Brother   No Known Problems Brother   Alcohol and Other Disorders Associated Brother   Other (HIV) Brother   No Known Problems Brother   Hypertension Sister   Other (history unknown) Sister     Social History  Tobacco Use  Smoking status: Never  Smokeless tobacco: Never  Vaping Use  Vaping status: Never Used  Alcohol use: Not Currently  Comment: 1 drink/week  Drug use: No      ROS:   GENERAL HEALTH: otherwise feels well; no weight loss  SKIN: denies any unusual skin lesions or rashes  EYES: no visual complaints or deficits  HEENT: denies nasal congestion, sinus pain or sore throat; hearing loss negative  RESPIRATORY: denies shortness of breath, wheezing or cough   CARDIOVASCULAR: denies chest pain or JJ; no palpitations   GI: denies nausea, vomiting, constipation, diarrhea; no rectal bleeding; no heartburn  MUSCULOSKELETAL: no joint complaints upper or lower extremities, no back or rib pain  NEURO: no sensory or motor complaint  PSYCHE: no symptoms of depression or anxiety  HEMATOLOGY: denies hx anemia; denies bruising or excessive bleeding  ENDOCRINE: denies excessive thirst or urination; denies unexpected wt gain or wt loss  ALLERGY/IMM.: denies food or seasonal allergies    PHYSICAL EXAM:   General Examination:   NECK/THYROID: neck supple, full range of motion, no cervical lymphadenopathy.   LYMPH NODES: no axillary, supraclavicular adenopathy.   HEART: no murmurs, regular rate and rhythm,   LUNGS: clear to auscultation bilaterally.   ABDOMEN: normal, bowel sounds present, soft, nontender, nondistended, no ascites, no hepatosplenomegaly.   EXTREMITIES: No clubbing cyanosis or edema  BREAST EXAM:  Axillary Lymph Nodes: No supraclavicular, infraclavicular or axillary adenopathy present  Breast: LEFT BREAST: No palpable discrete mass present.   RIGHT  BREAST: biopsy site high upper outer 11:00  No hematoma or ecchymosis .  No palp discrete mass present    Nipple: DISCHARGE: None. RETRACTION: None. SKIN CHANGES: None.   8/7/2024: Right breast no palpable discrete mass present. Biopsy wounds healed. No hematoma or ecchymosis present.  9/17/2024: Right breast lumpectomy wound healing well. Steri-Strips in place. No evidence of infection. No hematoma.    10/9/2024: Right breast lumpectomy wound healing well. Steri-Strips in place. No evidence of infection. No hematoma.  10/25/2024: Right breast lumpectomy wound healing well. Steri-Strips in place. No hematoma. No seroma. No evidence of infection.  5/9/2025: Right breast lumpectomy changes and radiation changes present. Biopsy site at the 7:00 location with Steri-Strips in place mild ecchymosis. No hematoma.0  5/23/2025: Right breast lumpectomy and radiation changes present. There is firmness on the outside of the lateral right breast at the previous lumpectomy site. There is mild radiation changes involving the skin. No other palp discrete mass present. There is accessory nipple at the 6:00 location of the inframammary fold.  6/25/2025: Mastectomy wound healing well. Steri-Strips in place. No cellulitis. No infection.  7/16/2025: Right mastectomy wound healed well. Large seroma present inferior aspect of the wound. Chronic radiation changes present. No infection no cellulitis    ASSESSMENT/ PLAN:   Mary R Hatchett is a 74 year old female with a diagnosis of right breast DCIS x 2.  Patient status post right breast lumpectomy wire localization x 2 on 9/9/2024.  Pathology reveals a very large single focus of 8 cm of DCIS. No invasive cancer present. Margins are close less than 1 mm inferior and superior margins.  The patient did undergo radiation therapy and completed this at [facility] on 1/23/2025.  Patient is on Arimidex under the direction of Dr. Mcdonald.  Patient developed recurrent right breast DCIS.  Patient  status post right breast mastectomy bariatric sentinel node biopsy.  Pathology reveals Tis N0 M0 stage 0 right breast cancer.  2 drains were removed  I discussed pathology results with the patient and her friend no seroma present.  Patient has developed a large seroma of the right mastectomy chest wall. Had extensive discussion with the patient as well as her friend as etiology of the above. I discussed the need for drains placement to decrease seroma formation. I discussed seroma catheter versus placement in the operating room with the full size drain. Given the radiation changes present and the fact that surgery was 1 month ago I would recommend placement in the operating room with 1-2 drains.  I discussed risk of procedure including bleeding, infection, recurrent seroma formation, need for revision of skin flap, as well as has risk with anesthesia were discussed in detail with the patient as well as her friend both understand consent was proceed with the operation. Will plan drainage of right chest wall seroma with drain placement under general anesthetic at [facility] tomorrow July 17, 2025.    I spent 30 minutes on this patient visit. This included: preparing to see patient, obtaining history, reviewing separately obtained history, performing physical examination, independently interpreting results and discussing with patient, patient and family counseling, referring and communicating with other healthcare professionals, and care coordination    Juan Valle MD, Dr. Renu Canseco

## 2025-07-17 NOTE — ANESTHESIA POSTPROCEDURE EVALUATION
Patient: Mary Reese Hatchett    Procedure Summary       Date: 07/17/25 Room / Location: [facility] MAIN OR 08 / [facility] MAIN OR    Anesthesia Start: 0922 Anesthesia Stop: 1010    Procedure: Post right mastectomy chest wall seroma drainage with drain placement Diagnosis: (Chest wall seroma)    Surgeons: Tu Magana MD Anesthesiologist: Joaquín Higuera MD    Anesthesia Type: general ASA Status: 3            Anesthesia Type: No value filed.    Vitals Value Taken Time   /93 07/17/25 10:07   Temp 97.8 07/17/25 10:10   Pulse 79 07/17/25 10:09   Resp 12 07/17/25 10:09   SpO2 97 % 07/17/25 10:09   Vitals shown include unfiled device data.    [facility] AN Post Evaluation:   Patient Evaluated in PACU  Level of Consciousness: awake and alert  Pain Score: 0  Pain Management: adequate  Airway Patency:  Dental exam unchanged from preop  Yes    Nausea/Vomiting: none  Cardiovascular Status: acceptable, blood pressure returned to baseline and hemodynamically stable  Respiratory Status: acceptable  Postoperative Hydration acceptable  Comments: Wide awake.  No pain, no nausea, no recall.  Voice and vision intact.  No apparent complications.      Joaquín Higuera MD  7/17/2025 10:10 AM

## 2025-07-17 NOTE — INTERVAL H&P NOTE
Pre-op Diagnosis: Chest wall seroma    The above referenced H&P was reviewed by Tu Magana MD on 7/17/2025, the patient was examined and no significant changes have occurred in the patient's condition since the H&P was performed.  I discussed with the patient and/or legal representative the potential benefits, risks and side effects of this procedure; the likelihood of the patient achieving goals; and potential problems that might occur during recuperation.  I discussed reasonable alternatives to the procedure, including risks, benefits and side effects related to the alternatives and risks related to not receiving this procedure.  We will proceed with procedure as planned.       (3) adequate

## (undated) DEVICE — 3M™ STERI-STRIP™ REINFORCED ADHESIVE SKIN CLOSURES, R1548, 1 IN X 5 IN (25 MM X 125 MM), 4 STRIPS/ENVELOPE: Brand: 3M™ STERI-STRIP™

## (undated) DEVICE — GLOVE SUR 7.5 SENSICARE PI PIP CRM PWD F

## (undated) DEVICE — SUT CHRM GUT 2-0 27IN SH ABSRB UD 26MM 1/2

## (undated) DEVICE — CONMED DISPOSABLE BRONCHIAL CYTOLOGY BRUSH, STRAIGHT HANDLE, 3 MM X 120 CM: Brand: CONMED

## (undated) DEVICE — ADHESIVE LIQ 2/3ML VI MASTISOL

## (undated) DEVICE — SOLUTION IRRIG 1000ML 0.9% NACL USP BTL

## (undated) DEVICE — PACK,UNIVERSAL,NO GOWNS: Brand: MEDLINE

## (undated) DEVICE — GAUZE,PACKING STRIP,IODOFORM,1/4"X5YD,ST: Brand: CURAD

## (undated) DEVICE — SUT COAT VCRL + 2-0 18IN ABSRB UD ANTIBACT

## (undated) DEVICE — SUCTION CANISTER, 3000CC,SAFELINER: Brand: DEROYAL

## (undated) DEVICE — ABSORBABLE WOUND CLOSURE DEVICE: Brand: V-LOC 90

## (undated) DEVICE — MINOR GENERAL: Brand: MEDLINE INDUSTRIES, INC.

## (undated) DEVICE — MAJOR GENERAL: Brand: MEDLINE INDUSTRIES, INC.

## (undated) DEVICE — ENCORE® LATEX MICRO SIZE 7.5, STERILE LATEX POWDER-FREE SURGICAL GLOVE: Brand: ENCORE

## (undated) DEVICE — SHEET, T, LAPAROTOMY, STERILE: Brand: MEDLINE

## (undated) DEVICE — STOCKINETTE,IMPERVIOUS,12X48,TABS: Brand: MEDLINE

## (undated) DEVICE — GAUZE,SPONGE,USP,4"X4",12PLY,STRL,10/PK: Brand: MEDLINE INDUSTRIES, INC.

## (undated) DEVICE — EVACUATOR SUR 100CC SIL BLB WND

## (undated) DEVICE — UNDERPANTS MAT 2XL FOR 24-64IN WAIST PUR

## (undated) DEVICE — HANDLE SUR BLU PLAS LT FLX SLIP ON ST DISP

## (undated) DEVICE — GAUZE,SPONGE,FLUFF,6"X6.75",STRL,5/TRAY: Brand: MEDLINE

## (undated) DEVICE — SUT ETHBND XL 2 30IN V-37 NABSRB GRN 40MM 1/2

## (undated) DEVICE — SKIN PREP TRAY 4 COMPARTM TRAY: Brand: MEDLINE INDUSTRIES, INC.

## (undated) DEVICE — SUT PERMA- 2-0 18IN FS NABSRB BLK 26MM 3/8

## (undated) DEVICE — AEGIS 1" DISK 4MM HOLE, PEEL OPEN: Brand: MEDLINE

## (undated) DEVICE — ZZ-CONVERTED-TO-522442- SPONGE 4X4 10PK

## (undated) DEVICE — BLAKE SILICONE DRAIN, 15 FR ROUND, HUBLESS WITH 3/16" TROCAR: Brand: BLAKE

## (undated) DEVICE — PATIENT RETURN ELECTRODE, SINGLE-USE, CONTACT QUALITY MONITORING, ADULT, WITH 9FT CORD, FOR PATIENTS WEIGING OVER 33LBS. (15KG): Brand: MEGADYNE

## (undated) DEVICE — ABSORBABLE WOUND CLOSURE DEVICE: Brand: V-LOC 180

## (undated) DEVICE — CONTAINER,SPECIMEN,OR STERILE,4OZ: Brand: MEDLINE

## (undated) DEVICE — SUT CHRM GUT 3-0 27IN SH ABSRB UD 26MM 1/2

## (undated) DEVICE — Device

## (undated) DEVICE — PAD,ABDOMINAL,8"X7.5",STERILE,LF,1/PK: Brand: MEDLINE

## (undated) DEVICE — SPONGE: SPECIALTY PEANUT XR 100/CS: Brand: MEDICAL ACTION INDUSTRIES

## (undated) DEVICE — EXOFIN PRECISION PEN HIGH VISCOSITY TOPICAL SKIN ADHESIVE: Brand: EXOFIN PRECISION PEN, 1G

## (undated) DEVICE — 3M™ STERI-STRIP™ REINFORCED ADHESIVE SKIN CLOSURES, R1547, 1/2 IN X 4 IN (12 MM X 100 MM), 6 STRIPS/ENVELOPE: Brand: 3M™ STERI-STRIP™